# Patient Record
Sex: FEMALE | Race: WHITE | Employment: OTHER | URBAN - METROPOLITAN AREA
[De-identification: names, ages, dates, MRNs, and addresses within clinical notes are randomized per-mention and may not be internally consistent; named-entity substitution may affect disease eponyms.]

---

## 2017-01-09 ENCOUNTER — HOSPITAL ENCOUNTER (OUTPATIENT)
Dept: MAMMOGRAPHY | Age: 72
Discharge: HOME | End: 2017-01-09
Attending: FAMILY MEDICINE
Payer: MEDICARE

## 2017-01-09 ENCOUNTER — HOSPITAL ENCOUNTER (OUTPATIENT)
Dept: BONE DENSITY | Age: 72
Discharge: HOME | End: 2017-01-09
Attending: FAMILY MEDICINE
Payer: MEDICARE

## 2017-01-09 DIAGNOSIS — Z78.0 POSTMENOPAUSAL STATE: ICD-10-CM

## 2017-01-09 DIAGNOSIS — Z13.820 OSTEOPOROSIS SCREENING: ICD-10-CM

## 2017-01-09 DIAGNOSIS — Z12.31 VISIT FOR SCREENING MAMMOGRAM: ICD-10-CM

## 2017-01-09 PROCEDURE — 77067 SCR MAMMO BI INCL CAD: CPT

## 2017-01-09 PROCEDURE — 77080 DXA BONE DENSITY AXIAL: CPT

## 2017-01-14 NOTE — PROGRESS NOTES
This patient is normal using the World Health Organization criteria  As compared to the prior study, there has been a decrease in the bone mineral  density of the lumbar spine of 1.1% and no statistically significant change in  the bone mineral density of the left total hip. 10 year probability of major osteoporotic fracture:  6.7%  10 year probability of hip fracture:  0.4%  National Osteoporosis Foundation recommends beginning pharmacological therapy in  postmenopausal women and men over the age of 48 with a 8 year probability of a  hip fracture of >3% OR with the 10 year probability of a major osteoporotic  fracture of >20%. Cont her calcium and vit D regimen and her regular exercise. Can cont to monitor for now  Repeat 2 yrs.

## 2017-11-27 ENCOUNTER — HOSPITAL ENCOUNTER (OUTPATIENT)
Dept: LAB | Age: 72
Discharge: HOME | End: 2017-11-27
Payer: MEDICARE

## 2017-11-27 ENCOUNTER — OFFICE VISIT (OUTPATIENT)
Dept: FAMILY MEDICINE CLINIC | Age: 72
End: 2017-11-27

## 2017-11-27 VITALS
RESPIRATION RATE: 18 BRPM | OXYGEN SATURATION: 98 % | HEIGHT: 65 IN | SYSTOLIC BLOOD PRESSURE: 108 MMHG | TEMPERATURE: 97.9 F | WEIGHT: 120.2 LBS | HEART RATE: 64 BPM | DIASTOLIC BLOOD PRESSURE: 68 MMHG | BODY MASS INDEX: 20.03 KG/M2

## 2017-11-27 DIAGNOSIS — E78.00 HYPERCHOLESTEROLEMIA: ICD-10-CM

## 2017-11-27 DIAGNOSIS — Z86.2 HISTORY OF IRON DEFICIENCY ANEMIA: ICD-10-CM

## 2017-11-27 DIAGNOSIS — Z12.31 ENCOUNTER FOR SCREENING MAMMOGRAM FOR BREAST CANCER: ICD-10-CM

## 2017-11-27 DIAGNOSIS — Z71.89 ACP (ADVANCE CARE PLANNING): ICD-10-CM

## 2017-11-27 DIAGNOSIS — E03.9 HYPOTHYROIDISM, UNSPECIFIED TYPE: ICD-10-CM

## 2017-11-27 DIAGNOSIS — Z00.00 ROUTINE GENERAL MEDICAL EXAMINATION AT A HEALTH CARE FACILITY: Primary | ICD-10-CM

## 2017-11-27 PROCEDURE — 80061 LIPID PANEL: CPT

## 2017-11-27 PROCEDURE — 82306 VITAMIN D 25 HYDROXY: CPT

## 2017-11-27 PROCEDURE — 80053 COMPREHEN METABOLIC PANEL: CPT

## 2017-11-27 PROCEDURE — 85027 COMPLETE CBC AUTOMATED: CPT

## 2017-11-27 PROCEDURE — 84443 ASSAY THYROID STIM HORMONE: CPT

## 2017-11-27 PROCEDURE — 81001 URINALYSIS AUTO W/SCOPE: CPT

## 2017-11-27 PROCEDURE — 36415 COLL VENOUS BLD VENIPUNCTURE: CPT

## 2017-11-27 NOTE — PROGRESS NOTES
Chief Complaint   [facility]   [facility]     -[id number]      1. Have you been to the ER, urgent care clinic since your last visit? Hospitalized since your last visit? No    2. Have you seen or consulted any other health care providers outside of the [address] [place] [address] since your last visit? Include any pap smears or colon screening.    Yes Dr Ashish Avila - Dermatologist

## 2017-11-27 NOTE — MR AVS SNAPSHOT
Visit Information Date & Time Provider Department Dept. Phone Encounter #  
 11/27/2017  9:00 AM Perico Larson, [address] [facility] [phone] [id number] Upcoming Health Maintenance Date Due COLONOSCOPY 1/17/2018 Hepatitis C Screening 1/3/2028* MEDICARE YEARLY EXAM 11/28/2018 BREAST CANCER SCRN MAMMOGRAM 1/9/2019 GLAUCOMA SCREENING Q2Y 11/27/2019 DTaP/Tdap/Td series (2 - Td) 11/21/2026 *Topic was postponed. The date shown is not the original due date. Allergies as of 11/27/2017  Review Complete On: 11/27/2017 By: Perico Larson MD  
  
 Severity Noted Reaction Type Reactions Pcn [Penicillins]  04/22/2010    Other (comments) As child-can't remember Current Immunizations  Reviewed on 11/27/2017 Name Date Hep A Vaccine 5/20/2013 Hep A Vaccine (Adult) 2/12/2014 Influenza High Dose Vaccine PF 11/10/2017, 11/7/2016 Influenza Vaccine 10/31/2015, 10/14/2014, 10/15/2013 Influenza Vaccine Split 10/10/2012 Influenza Vaccine Whole 10/4/2011 Pneumococcal Conjugate (PCV-13) 12/1/2016 Pneumococcal Polysaccharide (PPSV-23) 11/20/2015, 10/1/2010 TD Vaccine 9/26/2008 Reviewed by Perico Larson MD on 11/27/2017 at  9:39 AM  
You Were Diagnosed With   
  
 Codes Comments Routine general medical examination at a health care facility    -  Primary ICD-10-CM: Z00.00 ICD-9-CM: V70.0 ACP (advance care planning)     ICD-10-CM: Z71.89 ICD-9-CM: V65.49 Hypercholesterolemia     ICD-10-CM: E78.00 ICD-9-CM: 272.0 Hypothyroidism, unspecified type     ICD-10-CM: E03.9 ICD-9-CM: 743. 9 Vitals BP Pulse Temp Resp Height(growth percentile) Weight(growth percentile) 108/68 (BP 1 Location: Left arm, BP Patient Position: Sitting) 64 97.9 °F (36.6 °C) (Oral) 18 5' 4.5\" (1.638 m) 120 lb 3.2 oz (54.5 kg) SpO2 BMI OB Status Smoking Status 98% 20.31 kg/m2 Postmenopausal Never Smoker BMI and BSA Data Body Mass Index Body Surface Area  
 20.31 kg/m 2 1.57 m 2 Preferred Pharmacy Pharmacy Name Phone Samantha Miranda [address] [place], [place] [address] [facility] [address] [phone] Your Updated Medication List  
  
   
This list is accurate as of: 11/27/17  9:46 AM.  Always use your most recent med list.  
  
  
  
  
 CALCIUM 500+D 500 mg(1,250mg) -200 unit per tablet Generic drug:  calcium-vitamin D Take 1 Tab by mouth two (2) times daily (with meals). levothyroxine 25 mcg tablet Commonly known as:  SYNTHROID  
TAKE 1 TABLET EVERY DAY  BEFORE  BREAKFAST  
  
 VITAMIN D3 PO Take 2,000 Units by mouth Three (3) times a week. Patient Instructions Well Visit, Over 72: Care Instructions Your Care Instructions Physical exams can help you stay healthy. Your doctor has checked your overall health and may have suggested ways to take good care of yourself. He or she also may have recommended tests. At home, you can help prevent illness with healthy eating, regular exercise, and other steps. Follow-up care is a key part of your treatment and safety. Be sure to make and go to all appointments, and call your doctor if you are having problems. It's also a good idea to know your test results and keep a list of the medicines you take. How can you care for yourself at home? · Reach and stay at a healthy weight. This will lower your risk for many problems, such as obesity, diabetes, heart disease, and high blood pressure. · Get at least 30 minutes of exercise on most days of the week. Walking is a good choice. You also may want to do other activities, such as running, swimming, cycling, or playing tennis or team sports. · Do not smoke. Smoking can make health problems worse. If you need help quitting, talk to your doctor about stop-smoking programs and medicines. These can increase your chances of quitting for good. · Protect your skin from too much sun. When you're outdoors from 10 a.m. to 4 p.m., stay in the shade or cover up with clothing and a hat with a wide brim. Wear sunglasses that block UV rays. Even when it's cloudy, put broad-spectrum sunscreen (SPF 30 or higher) on any exposed skin. · See a dentist one or two times a year for checkups and to have your teeth cleaned. · Wear a seat belt in the car. · Limit alcohol to 2 drinks a day for men and 1 drink a day for women. Too much alcohol can cause health problems. Follow your doctor's advice about when to have certain tests. These tests can spot problems early. For men and women · Cholesterol. Your doctor will tell you how often to have this done based on your overall health and other things that can increase your risk for heart attack and stroke. · Blood pressure. Have your blood pressure checked during a routine doctor visit. Your doctor will tell you how often to check your blood pressure based on your age, your blood pressure results, and other factors. · Diabetes. Ask your doctor whether you should have tests for diabetes. · Vision. Experts recommend that you have yearly exams for glaucoma and other age-related eye problems. · Hearing. Tell your doctor if you notice any change in your hearing. You can have tests to find out how well you hear. · Colon cancer tests. Keep having colon cancer tests as your doctor recommends. You can have one of several types of tests. · Heart attack and stroke risk. At least every 4 to 6 years, you should have your risk for heart attack and stroke assessed. Your doctor uses factors such as your age, blood pressure, cholesterol, and whether you smoke or have diabetes to show what your risk for a heart attack or stroke is over the next 10 years. · Osteoporosis.  Talk to your doctor about whether you should have a bone density test to find out whether you have thinning bones. Also ask your doctor about whether you should take calcium and vitamin D supplements. For women · Pap test and pelvic exam. You may no longer need a Pap test. Talk with your doctor about whether to stop or continue to have Pap tests. · Breast exam and mammogram. Ask how often you should have a mammogram, which is an X-ray of your breasts. A mammogram can spot breast cancer before it can be felt and when it is easiest to treat. · Thyroid disease. Talk to your doctor about whether to have your thyroid checked as part of a regular physical exam. Women have an increased chance of a thyroid problem. For men · Prostate exam. Talk to your doctor about whether you should have a blood test (called a PSA test) for prostate cancer. Experts disagree on whether men should have this test. Some experts recommend that you discuss the benefits and risks of the test with your doctor. · Abdominal aortic aneurysm. Ask your doctor whether you should have a test to check for an aneurysm. You may need a test if you ever smoked or if your parent, brother, sister, or child has had an aneurysm. When should you call for help? Watch closely for changes in your health, and be sure to contact your doctor if you have any problems or symptoms that concern you. Where can you learn more? Go to http://jami-elisa.info/. Enter B846 in the search box to learn more about \"Well Visit, Over 65: Care Instructions. \" Current as of: May 12, 2017 Content Version: 11.4 © 6127-9079 Healthwise, Incorporated. Care instructions adapted under license by [employer] (which disclaims liability or warranty for this information). If you have questions about a medical condition or this instruction, always ask your healthcare professional. [place] [address] any warranty or liability for your use of this information. Introducing [facility]! Dear Gilma Corey: Thank you for requesting a [employer] account. Our records indicate that you already have an active [employer] account. You can access your account anytime at [email or web address] Did you know that you can access your hospital and ER discharge instructions at any time in [employer]? You can also review all of your test results from your hospital stay or ER visit. Additional Information If you have questions, please visit the Frequently Asked Questions section of the [employer] website at [email or web address] Remember, [employer] is NOT to be used for urgent needs. For medical emergencies, dial 911. Now available from your iPhone and Android! Please provide this summary of care documentation to your next provider. Your primary care clinician is listed as TARA MOON. If you have any questions after today's visit, please call [phone].

## 2017-11-27 NOTE — PROGRESS NOTES
Day [place] is a 81 y.o. female and presents for annual Medicare Wellness Visit. Problem List: Reviewed with patient and discussed risk factors.     Patient Active Problem List   Diagnosis Code    Family history of colon cancer Z80.0    Family history of breast cancer Z80.3    History of mild iron deficiency anemia Z86.2    Hypothyroidism E03.9    Postmenopausal, LMP 2006, No HRT Z78.0    Benign SQ Mass on right back R22.2    Right Inguinal hernia, mild K40.90    S/P colonoscopy Z98.890    Mild Hypercholesterolemia with Excellent HDL E78.00    Screening exams for skin cancer Z12.83    S/P Fever of unknown origin (FUO), Suspected Viral Illness R50.9    Hypothyroidism, adult E03.9    ACP (advance care planning) Z71.89       Current medical providers:  Patient Care Team:  Suellen Manuel MD as PCP - General    PSH: Reviewed with patient  Past Surgical History:   Procedure Laterality Date    BIOPSY BREAST  1981    negative, left breast; Dr Primus Cogan  2002    benign polyp; Dr Fermín Harper COLONOSCOPY  11/2007    normal; ok x 5yrs; Dr Clem Jerome; f/u 5 yrs    DEXA BONE DENSITY STUDY AXIAL  10/2001    normal    DEXA BONE DENSITY STUDY AXIAL  10/2007    normal    EKG ORDERABLE  9/2008    Sinus bradycardia    EKG ORDERABLE  9/2009    Sinus bradycardia    HM DEXA SCAN  10/2009, 10/2011    VPI (scanned reports)    HX BREAST BIOPSY Left     years ago; neg    HX COLONOSCOPY  1/17/13, Dr Eve Jang    Internal Hemorrhoids; repeat 5 yrs    HX DILATION AND CURETTAGE  1976    SAB    HX VEIN [address]    varicose veins, Dr Field Sessions Left 06/2016    for varicose veins    XR HIP RT W OR WO PELV 2-3 VWS  9/2008    normal        SH: Reviewed with patient  Social History   Substance Use Topics    Smoking status: Never Smoker    Smokeless tobacco: Never Used    Alcohol use Yes      Comment: 1 glass wine 2-3 times/week       FH: Reviewed with patient  Family History Problem Relation Age of Onset    Cancer Mother      lung cancer    Hypertension Mother    [facility] Arthritis-osteo Mother      obese    Diabetes Father     Cancer Father      bladder cancer and BCC skin cancer    Colon Cancer Sister       age 54    Arthritis-osteo Sister    [facility] Breast Cancer Sister      diagnosed age ~ 62, survivor    Depression Son     Anxiety Son     Breast Cancer Other      great aunt       Medications/Allergies: Reviewed with patient  Current Outpatient Prescriptions on File Prior to Visit   Medication Sig Dispense Refill    levothyroxine (SYNTHROID) 25 mcg tablet TAKE 1 TABLET EVERY DAY  BEFORE  BREAKFAST 90 Tab 3    CHOLECALCIFEROL, VITAMIN D3, (VITAMIN D3 PO) Take 2,000 Units by mouth Three (3) times a week.  calcium-vitamin D (CALCIUM 500+D) 500 mg(1,250mg) -200 unit per tablet Take 1 Tab by mouth two (2) times daily (with meals). No current facility-administered medications on file prior to visit. Allergies   Allergen Reactions    Pcn [Penicillins] Other (comments)     As child-can't remember       Objective:  Visit Vitals    /68 (BP 1 Location: Left arm, BP Patient Position: Sitting)    Pulse 64    Temp 97.9 °F (36.6 °C) (Oral)    Resp 18    Ht 5' 4.5\" (1.638 m)    Wt 120 lb 3.2 oz (54.5 kg)    SpO2 98%    BMI 20.31 kg/m2    Body mass index is 20.31 kg/(m^2). Assessment of cognitive impairment: Alert and oriented x 3    Depression Screen:   PHQ over the last two weeks 2017   Little interest or pleasure in doing things Not at all   Feeling down, depressed or hopeless Not at all   Total Score PHQ 2 0       Fall Risk Assessment:    Fall Risk Assessment, last 12 mths 2017   Able to walk? Yes   Fall in past 12 months? No       Functional Ability:   Does the patient exhibit a steady gait? yes   How long did it take the patient to get up and walk from a sitting position?  1 sec   Is the patient self reliant?  (ie can do own laundry, meals, household chores)  yes     Does the patient handle his/her own medications? yes     Does the patient handle his/her own money? yes     Is the patients home safe (ie good lighting, handrails on stairs and bath, etc.)? yes     Did you notice or did patient express any hearing difficulties? no     Did you notice or did patient express any vision difficulties?   no     Were distance and reading eye charts used? no       Advance Care Planning:   Patient was offered the opportunity to discuss advance care planning:  yes     Does patient have an Advance Directive:  yes   If no, did you provide information on Caring Connections? Plan:      Orders Placed This Encounter    JUJU MAMMO BI SCREENING INCL CAD    CBC W/O DIFF    LIPID PANEL    METABOLIC PANEL, COMPREHENSIVE    TSH 3RD GENERATION    URINALYSIS W/ RFLX MICROSCOPIC    VITAMIN D, 25 HYDROXY       Health Maintenance   Topic Date Due    COLONOSCOPY  01/17/2018    Hepatitis C Screening  01/03/[birth year]28 (Originally 1945)    MEDICARE YEARLY EXAM  11/28/2018    BREAST CANCER SCRN MAMMOGRAM  01/09/2019    GLAUCOMA SCREENING Q2Y  11/27/2019    DTaP/Tdap/Td series (2 - Td) 11/21/2026    OSTEOPOROSIS SCREENING (DEXA)  Completed    ZOSTER VACCINE AGE 60>  Completed    Pneumococcal 65+ Low/Medium Risk  Completed    Influenza Age 5 to Adult  Completed       *Patient verbalized understanding and agreement with the plan. A copy of the After Visit Summary with personalized health plan was given to the patient today.

## 2017-11-27 NOTE — ACP (ADVANCE CARE PLANNING)
ACP discussion w/ patient again today. Patient and  have an AMD. No additional questions, concerns, amendments at this time.

## 2017-11-27 NOTE — PROGRESS NOTES
HISTORY OF PRESENT ILLNESS   HPI  Fasting follow up mild hypercholesterolemia, hypothyroidism, labs and medication check. Overall has been getting along well and feeling well in general.  No complaints or concerns at this time. Still travels a lot w/ , they do a lot of hiking and international travel. Most recently [facility] a few weeks ago. Will be in [place] for the holidays next month. Good energy. No exertional sx. REVIEW OF SYMPTOMS     Review of Systems   Constitutional: Negative. Eyes:        Eye doctor visit today   Respiratory: Negative. Negative for shortness of breath. Cardiovascular: Negative. Negative for chest pain, palpitations, claudication and leg swelling. Gastrointestinal: Negative. Genitourinary: Negative. Musculoskeletal: Negative. Skin:        Sees Dermatologist routinely, had appt there recently.    Neurological: Negative.            PROBLEM LIST/MEDICAL HISTORY      Problem List  Date Reviewed: 11/27/2017          Codes Class Noted    ACP (advance care planning) ICD-10-CM: Z71.89  ICD-9-CM: V65.49  11/27/2017    Overview Signed 11/27/2017  9:51 AM by Charla Adams MD     Patient has an AMD. ACP note              Hypothyroidism, adult ICD-10-CM: E03.9  ICD-9-CM: 244.9  11/20/2015    Overview Signed 11/20/2015 10:01 AM by Charla Adams MD     Dx 2008; borderline tests 2003             S/P Fever of unknown origin (FUO), Suspected Viral Illness ICD-10-CM: R50.9  ICD-9-CM: 780.60  9/29/2014    Overview Signed 9/29/2014 12:51 PM by Charla Adams MD     7/2014-8/2014: ID Dr Jacobo Thacker             Mild Hypercholesterolemia with Excellent HDL ICD-10-CM: E78.00  ICD-9-CM: 272.0  11/14/2013        Screening exams for skin cancer ICD-10-CM: Z12.83  ICD-9-CM: V76.43  11/14/2013    Overview Signed 11/14/2013  3:11 PM by Charla Adams MD     Derm: Previously Dr Mounika Greene, then Dr Wan Salgado             Right Inguinal hernia, mild ICD-10-CM: K40.90  ICD-9-CM: 550.90  9/29/2010    Overview Signed 9/29/2010 10:00 AM by Ambrocio Leary MD     [id number]             S/P colonoscopy ICD-10-CM: T35.242  ICD-9-CM: V45.89  9/29/2010    Overview Signed 9/29/2010 10:01 AM by Ambrocio Leary MD     2002 benign polyp; 2007, normal; f/u 5 yrs; Dr Nickie Leary             Family history of colon cancer ICD-10-CM: Z80.0  ICD-9-CM: V16.0  4/22/2010    Overview Signed 4/22/2010  9:50 AM by Stan Rogers     sister             Family history of breast cancer ICD-10-CM: Z80.3  ICD-9-CM: V16.3  4/22/2010    Overview Signed 4/22/2010  9:50 AM by Stan Rogers     sister             History of mild iron deficiency anemia ICD-10-CM: Z86.2  ICD-9-CM: V12.3  4/22/2010        Hypothyroidism ICD-10-CM: E03.9  ICD-9-CM: 244.9  4/22/2010    Overview Addendum 9/29/2010  9:59 AM by Ambrocio Leary MD     Dx 2008; borderline tests 2003             Postmenopausal, LMP 2006, No HRT ICD-10-CM: Z78.0  ICD-9-CM: V49.81  4/22/2010    Overview Signed 4/22/2010  9:52 AM by Stan Rogers     LMP 10/2006 no HRT             Benign SQ Mass on right back ICD-10-CM: R22.2  ICD-9-CM: 782.2  4/22/2010    Overview Signed 4/22/2010  9:53 AM by Genny Sharma     1980's R low back mass Lipoma vs Cyst                       PAST SURGICAL HISTORY       Past Surgical History:   Procedure Laterality Date    BIOPSY BREAST  1981    negative, left breast; Dr Chris Hughes  2002    benign polyp; Dr Ernesto Sanchez  11/2007    normal; ok x 5yrs; Dr Wilburn Rm; f/u 5 yrs    DEXA BONE DENSITY STUDY AXIAL  10/2001    normal    DEXA BONE DENSITY STUDY AXIAL  10/2007    normal    EKG ORDERABLE  9/2008    Sinus bradycardia    EKG ORDERABLE  9/2009    Sinus bradycardia    HM DEXA SCAN  10/2009, 10/2011    VPI (scanned reports)    HX BREAST BIOPSY Left     years ago; neg    HX COLONOSCOPY  1/17/13, Dr Gavi Toscano    Internal Hemorrhoids; repeat 5 yrs    HX DILATION AND CURETTAGE 1976    SAB    HX VEIN STRIPPING  1982    varicose veins, Dr Pierre Cameron Left 06/2016    for varicose veins    XR HIP RT W OR WO PELV 2-3 VWS  9/2008    normal         MEDICATIONS      Current Outpatient Prescriptions   Medication Sig    levothyroxine (SYNTHROID) 25 mcg tablet TAKE 1 TABLET EVERY DAY  BEFORE  BREAKFAST    CHOLECALCIFEROL, VITAMIN D3, (VITAMIN D3 PO) Take 2,000 Units by mouth Three (3) times a week.  calcium-vitamin D (CALCIUM 500+D) 500 mg(1,250mg) -200 unit per tablet Take 1 Tab by mouth two (2) times daily (with meals). No current facility-administered medications for this visit. ALLERGIES     Allergies   Allergen Reactions    Pcn [Penicillins] Other (comments)     As child-can't remember          SOCIAL HISTORY       Social History     Social History    Marital status:      Spouse name: N/A    Number of children: 3    Years of education: N/A     Occupational History    Homemaker      Social History Main Topics    Smoking status: Never Smoker    Smokeless tobacco: Never Used    Alcohol use Yes      Comment: 1 glass wine 2-3 times/week    Drug use: No    Sexual activity: Yes     Partners: Male     Other Topics Concern     Service No    Blood Transfusions No    Caffeine Concern No     none, except occ chocolate    Occupational Exposure No    Hobby Hazards No    Sleep Concern No    Stress Concern No    Weight Concern No     pretty stable, usually runs ~ 120 range    Special Diet No     sensible diet, well balanced    Back Care No    Exercise Yes     hikes alot w/ , walks regularly ~ 45minutes to an hour almost every day; tennis seasonally ~ 2x a week; occ bikes    Bike Helmet Yes    Seat Belt Yes    Self-Exams Yes     Social History Narrative    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        IMMUNIZATIONS  Immunization History Administered Date(s) Administered    Hep A Vaccine 2013    Hep A Vaccine (Adult) 2014    Influenza High Dose Vaccine PF 2016, 11/10/2017    Influenza Vaccine 10/15/2013, 10/14/2014, 10/31/2015    Influenza Vaccine Split 10/10/2012    Influenza Vaccine Whole 10/04/2011    Pneumococcal Conjugate (PCV-13) 2016    Pneumococcal Polysaccharide (PPSV-23) 10/01/2010, 2015    TD Vaccine 2008         FAMILY HISTORY     Family History   Problem Relation Age of Onset    Cancer Mother      lung cancer    Hypertension Mother    [facility] Arthritis-osteo Mother      obese    Diabetes Father     Cancer Father      bladder cancer and BCC skin cancer    Colon Cancer Sister       age 52    [de-identified] Sister    [facility] Breast Cancer Sister      diagnosed age ~ 62, survivor    Depression Son     Anxiety Son     Breast Cancer Other      great aunt         VITALS     Visit Vitals    /68 (BP 1 Location: Left arm, BP Patient Position: Sitting)    Pulse 64    Temp 97.9 °F (36.6 °C) (Oral)    Resp 18    Ht 5' 4.5\" (1.638 m)    Wt 120 lb 3.2 oz (54.5 kg)    SpO2 98%    BMI 20.31 kg/m2          PHYSICAL EXAMINATION     Physical Exam   Constitutional: She is well-developed, well-nourished, and in no distress. HENT:   Right Ear: Tympanic membrane normal.   Left Ear: Tympanic membrane normal.   Nose: Nose normal.   Mouth/Throat: Oropharynx is clear and moist. No oropharyngeal exudate. Neck: Neck supple. Carotid bruit is not present. No thyromegaly present. Cardiovascular: Normal rate, regular rhythm and normal heart sounds. No murmur heard. Pulmonary/Chest: Effort normal and breath sounds normal.   Abdominal: Soft. Bowel sounds are normal. She exhibits no distension and no mass. There is no tenderness. Musculoskeletal: She exhibits no edema or tenderness. Lymphadenopathy:     She has no cervical adenopathy.    Neurological: Gait normal.   Vitals reviewed.        ASSESSMENT & PLAN       ICD-10-CM ICD-9-CM    1. Routine general medical examination at a health care facility Z00.00 V70.0 Medicare AWV,    2. ACP (advance care planning) Z71.89 V65.49 Patient has an AMD   3. Mild Hypercholesterolemia with Excellent HDL E78.00 272.0 LIPID PANEL      METABOLIC PANEL, COMPREHENSIVE      URINALYSIS W/ RFLX MICROSCOPIC   4. Hypothyroidism, unspecified type E22.4 479.5 METABOLIC PANEL, COMPREHENSIVE      TSH 3RD GENERATION      VITAMIN D, 25 HYDROXY   5. History of mild iron deficiency anemia Z86.2 V12.3 CBC W/O DIFF   6. Encounter for screening mammogram for breast cancer Z12.31 V76.12 JUJU MAMMO BI SCREENING INCL CAD     Fasting labs today  Reviewed diet, nutrition, exercise and cardiovascular risk and specific lipid/LDL goals reviewed  Health maintenance, wellness, prevention counseling, screening recommendations  Further follow up & other recommendations pending review of labs. If all remains good and stable, RTC  1yr for annual checkup and wellness visit. Follow up sooner prn  Mammogram ordered, due 1-2018.   She is already scheduled for her 5 yr colonoscopy follow up 1-18-18

## 2017-11-27 NOTE — PATIENT INSTRUCTIONS
Well Visit, Over 72: Care Instructions  Your Care Instructions    Physical exams can help you stay healthy. Your doctor has checked your overall health and may have suggested ways to take good care of yourself. He or she also may have recommended tests. At home, you can help prevent illness with healthy eating, regular exercise, and other steps. Follow-up care is a key part of your treatment and safety. Be sure to make and go to all appointments, and call your doctor if you are having problems. It's also a good idea to know your test results and keep a list of the medicines you take. How can you care for yourself at home? · Reach and stay at a healthy weight. This will lower your risk for many problems, such as obesity, diabetes, heart disease, and high blood pressure. · Get at least 30 minutes of exercise on most days of the week. Walking is a good choice. You also may want to do other activities, such as running, swimming, cycling, or playing tennis or team sports. · Do not smoke. Smoking can make health problems worse. If you need help quitting, talk to your doctor about stop-smoking programs and medicines. These can increase your chances of quitting for good. · Protect your skin from too much sun. When you're outdoors from 10 a.m. to 4 p.m., stay in the shade or cover up with clothing and a hat with a wide brim. Wear sunglasses that block UV rays. Even when it's cloudy, put broad-spectrum sunscreen (SPF 30 or higher) on any exposed skin. · See a dentist one or two times a year for checkups and to have your teeth cleaned. · Wear a seat belt in the car. · Limit alcohol to 2 drinks a day for men and 1 drink a day for women. Too much alcohol can cause health problems. Follow your doctor's advice about when to have certain tests. These tests can spot problems early. For men and women  · Cholesterol.  Your doctor will tell you how often to have this done based on your overall health and other things that can increase your risk for heart attack and stroke. · Blood pressure. Have your blood pressure checked during a routine doctor visit. Your doctor will tell you how often to check your blood pressure based on your age, your blood pressure results, and other factors. · Diabetes. Ask your doctor whether you should have tests for diabetes. · Vision. Experts recommend that you have yearly exams for glaucoma and other age-related eye problems. · Hearing. Tell your doctor if you notice any change in your hearing. You can have tests to find out how well you hear. · Colon cancer tests. Keep having colon cancer tests as your doctor recommends. You can have one of several types of tests. · Heart attack and stroke risk. At least every 4 to 6 years, you should have your risk for heart attack and stroke assessed. Your doctor uses factors such as your age, blood pressure, cholesterol, and whether you smoke or have diabetes to show what your risk for a heart attack or stroke is over the next 10 years. · Osteoporosis. Talk to your doctor about whether you should have a bone density test to find out whether you have thinning bones. Also ask your doctor about whether you should take calcium and vitamin D supplements. For women  · Pap test and pelvic exam. You may no longer need a Pap test. Talk with your doctor about whether to stop or continue to have Pap tests. · Breast exam and mammogram. Ask how often you should have a mammogram, which is an X-ray of your breasts. A mammogram can spot breast cancer before it can be felt and when it is easiest to treat. · Thyroid disease. Talk to your doctor about whether to have your thyroid checked as part of a regular physical exam. Women have an increased chance of a thyroid problem. For men  · Prostate exam. Talk to your doctor about whether you should have a blood test (called a PSA test) for prostate cancer.  Experts disagree on whether men should have this test. Some experts recommend that you discuss the benefits and risks of the test with your doctor. · Abdominal aortic aneurysm. Ask your doctor whether you should have a test to check for an aneurysm. You may need a test if you ever smoked or if your parent, brother, sister, or child has had an aneurysm. When should you call for help? Watch closely for changes in your health, and be sure to contact your doctor if you have any problems or symptoms that concern you. Where can you learn more? Go to http://jami-elisa.info/. Enter N498 in the search box to learn more about \"Well Visit, Over 65: Care Instructions. \"  Current as of: May 12, 2017  Content Version: 11.4  © 8496-8017 [employer]. Care instructions adapted under license by [employer] (which disclaims liability or warranty for this information). If you have questions about a medical condition or this instruction, always ask your healthcare professional. [place] [address] any warranty or liability for your use of this information. Schedule of Personalized Health Plan  (Provide Copy to Patient)  The best way to stay healthy is to live a healthy lifestyle. A healthy lifestyle includes regular exercise, eating a well-balanced diet, keeping a healthy weight and not smoking. Regular physical exams and screening tests are another important way to take care of yourself. Preventive exams provided by health care providers can find health problems early when treatment works best and can keep you from getting certain diseases or illnesses. Preventive services include exams, lab tests, screenings, shots, monitoring and information to help you take care of your own health. All people over 65 should have a pneumonia shot. Pneumonia shots are usually only needed once in a lifetime unless your doctor decides differently. All people over 65 should have a yearly flu shot.     People over 65 are at medium to high risk for Hepatitis B. Three shots are needed for complete protection. In addition to your physical exam, some screening tests are recommended:    Bone mass measurement (dexa scan) is recommended every two years  Diabetes Mellitus screening is recommended every year. Glaucoma is an eye disease caused by high pressure in the eye. An eye exam is recommended every year. Cardiovascular screening tests that check your cholesterol and other blood fat (lipid) levels are recommended every five years. Colorectal Cancer screening tests help to find pre-cancerous polyps (growths in the colon) so they can be removed before they turn into cancer. Tests ordered for screening depend on your personal and family history risk factors.     Screening for Breast Cancer is recommended yearly with a mammogram.    Screening for Cervical Cancer is recommended every two years (annually for certain risk factors, such as previous history of STD or abnormal PAP in past 7 years), with a Pelvic Exam with PAP    Here is a list of your current Health Maintenance items with a due date:  Health Maintenance   Topic Date Due    COLONOSCOPY  01/17/2018    Hepatitis C Screening  01/03/2028 (Originally 1945)   [id number] [facility]  11/28/2018    BREAST CANCER SCRN MAMMOGRAM  01/09/2019    GLAUCOMA SCREENING Q2Y  11/27/2019    DTaP/Tdap/Td series (2 - Td) 11/21/2026    OSTEOPOROSIS SCREENING (DEXA)  Completed    ZOSTER VACCINE AGE 60>  Completed    Pneumococcal 65+ Low/Medium Risk  Completed    Influenza Age 5 to Adult  Completed

## 2017-11-28 LAB
25(OH)D3+25(OH)D2 SERPL-MCNC: 40.4 NG/ML (ref 30–100)
ALBUMIN SERPL-MCNC: 4.3 G/DL (ref 3.5–4.8)
ALBUMIN/GLOB SERPL: 1.6 {RATIO} (ref 1.2–2.2)
ALP SERPL-CCNC: 53 IU/L (ref 39–117)
ALT SERPL-CCNC: 16 IU/L (ref 0–32)
APPEARANCE UR: CLEAR
AST SERPL-CCNC: 26 IU/L (ref 0–40)
BILIRUB SERPL-MCNC: 0.7 MG/DL (ref 0–1.2)
BILIRUB UR QL STRIP: NEGATIVE
BUN SERPL-MCNC: 17 MG/DL (ref 8–27)
BUN/CREAT SERPL: 22 (ref 12–28)
CALCIUM SERPL-MCNC: 9.7 MG/DL (ref 8.7–10.3)
CHLORIDE SERPL-SCNC: 99 MMOL/L (ref 96–106)
CHOLEST SERPL-MCNC: 243 MG/DL (ref 100–199)
CO2 SERPL-SCNC: 25 MMOL/L (ref 18–29)
COLOR UR: YELLOW
CREAT SERPL-MCNC: 0.77 MG/DL (ref 0.57–1)
ERYTHROCYTE [DISTWIDTH] IN BLOOD BY AUTOMATED COUNT: 13 % (ref 12.3–15.4)
GFR SERPLBLD CREATININE-BSD FMLA CKD-EPI: 78 ML/MIN/1.73
GFR SERPLBLD CREATININE-BSD FMLA CKD-EPI: 90 ML/MIN/1.73
GLOBULIN SER CALC-MCNC: 2.7 G/DL (ref 1.5–4.5)
GLUCOSE SERPL-MCNC: 82 MG/DL (ref 65–99)
GLUCOSE UR QL: NEGATIVE
HCT VFR BLD AUTO: 40.1 % (ref 34–46.6)
HDLC SERPL-MCNC: 91 MG/DL
HGB BLD-MCNC: 13.9 G/DL (ref 11.1–15.9)
HGB UR QL STRIP: NEGATIVE
INTERPRETATION, 910389: NORMAL
KETONES UR QL STRIP: NEGATIVE
LDLC SERPL CALC-MCNC: 140 MG/DL (ref 0–99)
LEUKOCYTE ESTERASE UR QL STRIP: NEGATIVE
MCH RBC QN AUTO: 31.3 PG (ref 26.6–33)
MCHC RBC AUTO-ENTMCNC: 34.7 G/DL (ref 31.5–35.7)
MCV RBC AUTO: 90 FL (ref 79–97)
MICRO URNS: NORMAL
NITRITE UR QL STRIP: NEGATIVE
PH UR STRIP: 6 [PH] (ref 5–7.5)
PLATELET # BLD AUTO: 226 X10E3/UL (ref 150–379)
POTASSIUM SERPL-SCNC: 4.2 MMOL/L (ref 3.5–5.2)
PROT SERPL-MCNC: 7 G/DL (ref 6–8.5)
PROT UR QL STRIP: NEGATIVE
RBC # BLD AUTO: 4.44 X10E6/UL (ref 3.77–5.28)
SODIUM SERPL-SCNC: 143 MMOL/L (ref 134–144)
SP GR UR: 1.01 (ref 1–1.03)
TRIGL SERPL-MCNC: 62 MG/DL (ref 0–149)
TSH SERPL DL<=0.005 MIU/L-ACNC: 2.55 UIU/ML (ref 0.45–4.5)
UROBILINOGEN UR STRIP-MCNC: 0.2 MG/DL (ref 0.2–1)
VLDLC SERPL CALC-MCNC: 12 MG/DL (ref 5–40)
WBC # BLD AUTO: 4.5 X10E3/UL (ref 3.4–10.8)

## 2017-12-31 RX ORDER — LEVOTHYROXINE SODIUM 25 UG/1
TABLET ORAL
Qty: 90 TAB | Refills: 3 | Status: SHIPPED | OUTPATIENT
Start: 2017-12-31 | End: 2019-01-22 | Stop reason: SDUPTHER

## 2017-12-31 NOTE — TELEPHONE ENCOUNTER
Future Appointments:  1/17/2018  9:30 AM    [facility]                  [address] [facility] [address]  11/28/2018 9:00 AM    [address] [facility] [place]  [facility]

## 2018-01-31 ENCOUNTER — HOSPITAL ENCOUNTER (OUTPATIENT)
Dept: MAMMOGRAPHY | Age: 73
Discharge: HOME | End: 2018-01-31
Attending: FAMILY MEDICINE
Payer: MEDICARE

## 2018-01-31 DIAGNOSIS — Z12.31 ENCOUNTER FOR SCREENING MAMMOGRAM FOR BREAST CANCER: ICD-10-CM

## 2018-01-31 PROCEDURE — 77067 SCR MAMMO BI INCL CAD: CPT

## 2018-04-13 ENCOUNTER — TELEPHONE (OUTPATIENT)
Dept: FAMILY MEDICINE CLINIC | Age: 73
End: 2018-04-13

## 2018-04-13 NOTE — TELEPHONE ENCOUNTER
----- Message -----         From: Sundeep Squires        Sent: 4/13/2018   9:31 AM          To: [facility]  Pool     Subject: Appointment Request ()                               Appointment Request From: Sundeep Squires          With Provider: Belinda Agarwal MD [-Primary Care Physician-]          Preferred Date Range: Any          Preferred Times: Any          Reason: To address the following health maintenance concerns.     Colonoscopy          Comments:     I had a colonoscopy on February 1, 2018 with Dr. Jacqueline Jeffers.  You should have received results from him; please update My chart.

## 2018-11-28 ENCOUNTER — OFFICE VISIT (OUTPATIENT)
Dept: FAMILY MEDICINE CLINIC | Age: 73
End: 2018-11-28

## 2018-11-28 VITALS
RESPIRATION RATE: 18 BRPM | OXYGEN SATURATION: 97 % | DIASTOLIC BLOOD PRESSURE: 60 MMHG | BODY MASS INDEX: 19.66 KG/M2 | SYSTOLIC BLOOD PRESSURE: 116 MMHG | HEIGHT: 65 IN | HEART RATE: 60 BPM | WEIGHT: 118 LBS | TEMPERATURE: 97.6 F

## 2018-11-28 DIAGNOSIS — E03.9 HYPOTHYROIDISM, ADULT: ICD-10-CM

## 2018-11-28 DIAGNOSIS — Z00.00 MEDICARE ANNUAL WELLNESS VISIT, SUBSEQUENT: Primary | ICD-10-CM

## 2018-11-28 DIAGNOSIS — E78.00 HYPERCHOLESTEROLEMIA: ICD-10-CM

## 2018-11-28 NOTE — PROGRESS NOTES
Chief Complaint Patient presents with [place] Annual Wellness Visit -SMAVRWA 1. Have you been to the ER, urgent care clinic since your last visit? Hospitalized since your last visit? No 
 
2. Have you seen or consulted any other health care providers outside of the [facility] since your last visit? Include any pap smears or colon screening.  No

## 2018-11-28 NOTE — PATIENT INSTRUCTIONS
Medicare Wellness Visit, Female The best way to live healthy is to have a lifestyle where you eat a well-balanced diet, exercise regularly, limit alcohol use, and quit all forms of tobacco/nicotine, if applicable. Regular preventive services are another way to keep healthy. Preventive services (vaccines, screening tests, monitoring & exams) can help personalize your care plan, which helps you manage your own care. Screening tests can find health problems at the earliest stages, when they are easiest to treat. [facility] follows the current, evidence-based guidelines published by the [facility] ([facility]) when recommending preventive services for our patients. Because we follow these guidelines, sometimes recommendations change over time as research supports it. (For example, mammograms used to be recommended annually. Even though Medicare will still pay for an annual mammogram, the newer guidelines recommend a mammogram every two years for women of average risk.) Of course, you and your doctor may decide to screen more often for some diseases, based on your risk and your health status. Preventive services for you include: - Medicare offers their members a free annual wellness visit, which is time for you and your primary care provider to discuss and plan for your preventive service needs. Take advantage of this benefit every year! 
-All adults over the age of 72 should receive the recommended pneumonia vaccines. Current USPSTF guidelines recommend a series of two vaccines for the best pneumonia protection.  
-All adults should have a flu vaccine yearly and a tetanus vaccine every 10 years. All adults age 61 and older should receive a shingles vaccine once in their lifetime.   
-A bone mass density test is recommended when a woman turns 65 to screen for osteoporosis. This test is only recommended one time, as a screening. Some providers will use this same test as a disease monitoring tool if you already have osteoporosis. -All adults age 38-68 who are overweight should have a diabetes screening test once every three years.  
-Other screening tests and preventive services for persons with diabetes include: an eye exam to screen for diabetic retinopathy, a kidney function test, a foot exam, and stricter control over your cholesterol.  
-Cardiovascular screening for adults with routine risk involves an electrocardiogram (ECG) at intervals determined by your doctor.  
-Colorectal cancer screenings should be done for adults age 54-65 with no increased risk factors for colorectal cancer. There are a number of acceptable methods of screening for this type of cancer. Each test has its own benefits and drawbacks. Discuss with your doctor what is most appropriate for you during your annual wellness visit. The different tests include: colonoscopy (considered the best screening method), a fecal occult blood test, a fecal DNA test, and sigmoidoscopy. -Breast cancer screenings are recommended every other year for women of normal risk, age 54-69. 
-Cervical cancer screenings for women over age 72 are only recommended with certain risk factors.  
-All adults born between [facility] should be screened once for Hepatitis C. Here is a list of your current Health Maintenance items (your personalized list of preventive services) with a due date: 
Health Maintenance Due Topic Date Due  Shingles Vaccine (1 of 2) 09/18/1997

## 2018-11-28 NOTE — PROGRESS NOTES
HISTORY OF PRESENT ILLNESS  
HPI Fasting follow up hypercholesterolemia, hypothyroidism and labs. Overall has been getting along well and feeling well in general. 
No complaints or concerns at this time. Continues to follow healthy, well balanced diet, exercises regularly, goes on hiking trips several times a year w/ her  and family, does a lot of international travel. Going to [place] in 2-2019. She has her updated vaccines in her files at home and will scan them to her MyChart or send them to me. She will check on last Td and if >10yrs will get booster. REVIEW OF SYMPTOMS  
  Review of Systems Constitutional: Negative. HENT: Negative for hearing loss. Eyes: Negative. Respiratory: Negative. Cardiovascular: Negative. Gastrointestinal: Negative. Genitourinary: Negative. Musculoskeletal: Negative. Neurological: Negative. Endo/Heme/Allergies: Negative. Psychiatric/Behavioral: Negative.   
 
 
  
 PROBLEM LIST/MEDICAL HISTORY  
  
Problem List  Date Reviewed: 11/28/2018 Codes Class Noted ACP (advance care planning) ICD-10-CM: Z71.89 ICD-9-CM: V65.49  11/27/2017 Overview Signed 11/27/2017  9:51 AM by Priti Kirkpatrick MD  
  Patient has an AMD. ACP note  Hypothyroidism, adult ICD-10-CM: E03.9 ICD-9-CM: 244.9  11/20/2015 Overview Signed 11/20/2015 10:01 AM by Priti Kirkpatrick MD  
  Dx 2008; borderline tests 2003 S/P Fever of unknown origin (FUO), Suspected Viral Illness ICD-10-CM: R50.9 ICD-9-CM: 780.60  9/29/2014 Overview Signed 9/29/2014 12:51 PM by Priti Kirkpatrick MD  
  7/2014-8/2014: ID Dr Johnson Irizarry Mild Hypercholesterolemia with Excellent HDL ICD-10-CM: E78.00 ICD-9-CM: 272.0  11/14/2013 Screening exams for skin cancer ICD-10-CM: Z12.83 ICD-9-CM: V76.43  11/14/2013  Overview Signed 11/14/2013  3:11 PM by Priti Kirkpatrick MD  
 Derm: Previously Dr Alyx Hutchison, then Dr Cornejo Left Right Inguinal hernia, mild ICD-10-CM: K40.90 ICD-9-CM: 550.90  9/29/2010 Overview Signed 9/29/2010 10:00 AM by Ovidio Darling MD  
  6563'U S/P colonoscopy ICD-10-CM: X50.675 ICD-9-CM: V45.89  9/29/2010 Overview Signed 9/29/2010 10:01 AM by Ovidio Darling MD  
  2002 benign polyp; 2007, normal; f/u 5 yrs; Dr Paulo Loredo Family history of colon cancer ICD-10-CM: Z80.0 ICD-9-CM: V16.0  4/22/2010 Overview Signed 4/22/2010  9:50 AM by Willam Yates  
  sister Family history of breast cancer ICD-10-CM: Z80.3 ICD-9-CM: V16.3  4/22/2010 Overview Signed 4/22/2010  9:50 AM by Willam Yates  
  sister History of mild iron deficiency anemia ICD-10-CM: Z86.2 ICD-9-CM: V12.3  4/22/2010 Hypothyroidism ICD-10-CM: E03.9 ICD-9-CM: 244.9  4/22/2010 Overview Addendum 9/29/2010  9:59 AM by Ovidio Darling MD  
  Dx 2008; borderline tests 2003 Postmenopausal, LMP 2006, No HRT ICD-10-CM: Z78.0 ICD-9-CM: V49.81  4/22/2010 Overview Signed 4/22/2010  9:52 AM by Willam Yates LMP 10/2006 no HRT Benign SQ Mass on right back ICD-10-CM: R22.2 ICD-9-CM: 782.2  4/22/2010 Overview Signed 4/22/2010  9:53 AM by Willam Yates  
  1980's R low back mass Lipoma vs Cyst 
  
  
   
  
 
 
  PAST SURGICAL HISTORY  
   
Past Surgical History:  
Procedure Laterality Date [address]  
 negative, left breast; Dr Ana Kong  COLONOSCOPY  2002  
 benign polyp; Dr Paulo Loredo  COLONOSCOPY  11/2007  
 normal; ok x 5yrs; Dr Adolph Hernández; f/u 5 yrs  DEXA BONE DENSITY STUDY AXIAL  10/2001  
 normal  
 DEXA BONE DENSITY STUDY AXIAL  10/2007  
 normal  
 EKG ORDERABLE  9/2008 Sinus bradycardia  EKG ORDERABLE  9/2009 Sinus bradycardia  HM DEXA SCAN  10/2009, 10/2011 VPI (scanned reports)  HX BREAST BIOPSY Left 1982  
 years ago; neg  
  HX COLONOSCOPY  1/17/13, Dr Leann Miguel Internal Hemorrhoids; repeat 5 yrs [address]  HX VEIN STRIPPING  1982  
 varicose veins, Dr Joe Jung  HX VEIN STRIPPING Left 06/2016  
 for varicose veins  XR HIP RT W OR WO PELV 2-3 VWS  9/2008  
 normal  
 
 
 
MEDICATIONS  
  
Current Outpatient Medications Medication Sig  levothyroxine (SYNTHROID) 25 mcg tablet TAKE 1 TABLET EVERY DAY  BEFORE  BREAKFAST  CHOLECALCIFEROL, VITAMIN D3, (VITAMIN D3 PO) Take 2,000 Units by mouth Three (3) times a week.  calcium-vitamin D (CALCIUM 500+D) 500 mg(1,250mg) -200 unit per tablet Take 1 Tab by mouth two (2) times daily (with meals). No current facility-administered medications for this visit. ALLERGIES Allergies Allergen Reactions  Pcn [Penicillins] Other (comments) As child-can't remember SOCIAL HISTORY  
   
Social History Socioeconomic History  Marital status:  Spouse name: Not on file  Number of children: 3  
 Years of education: Not on file  Highest education level: Not on file Social Needs  Financial resource strain: Not on file  Food insecurity - worry: Not on file  Food insecurity - inability: Not on file  Transportation needs - medical: Not on file  Transportation needs - non-medical: Not on file Occupational History  Occupation: Homemaker Tobacco Use  Smoking status: Never Smoker  Smokeless tobacco: Never Used Substance and Sexual Activity  Alcohol use: Yes Comment: 1 glass wine 2-3 times/week  Drug use: No  
 Sexual activity: Yes  
  Partners: Male Other Topics Concern   Service No  
 Blood Transfusions No  
 Caffeine Concern No  
  Comment: none, except occ chocolate  Occupational Exposure No  
 Hobby Hazards No  
 Sleep Concern No  
 Stress Concern No  
 Weight Concern No  
  Comment: pretty stable, usually runs ~ 120 range  Special Diet No  
 Comment: sensible diet, well balanced  Back Care No  
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   
 Patient and  have an Advance Directive.  
 
  
IMMUNIZATIONS Immunization History Administered Date(s) Administered  Hep A Vaccine 2013  Hep A Vaccine (Adult) 2014  Influenza High Dose Vaccine PF 2016, 11/10/2017, 09/15/2018  Influenza Vaccine 10/15/2013, 10/14/2014, 10/31/2015  Influenza Vaccine Split 10/10/2012  Influenza Vaccine Whole 10/04/2011  Pneumococcal Conjugate (PCV-13) 2016  Pneumococcal Polysaccharide (PPSV-23) 10/01/2010, 2015  TD Vaccine 2008  Zoster Vaccine, Live 2003 FAMILY HISTORY Family History Problem Relation Age of Onset  Cancer Mother   
     lung cancer  Hypertension Mother  Arthritis-osteo Mother   
     obese  Diabetes Father  Cancer Father   
     bladder cancer and BCC skin cancer  Colon Cancer Sister   
      age 52  Arthritis-osteo Sister  Breast Cancer Sister   
     diagnosed age ~ 62, survivor  Depression Son  Anxiety Son  Breast Cancer Other   
     great aunt [place] Visit Vitals /60 (BP 1 Location: Left arm, BP Patient Position: Sitting) Pulse 60 Temp 97.6 °F (36.4 °C) (Oral) Resp 18 Ht 5' 4.5\" (1.638 m) Wt 118 lb (53.5 kg) SpO2 97% BMI 19.94 kg/m² PHYSICAL EXAMINATION  
  Physical Exam  
Constitutional: She is oriented to person, place, and time and well-developed, well-nourished, and in no distress.   
HENT:  
Right Ear: Tympanic membrane normal.  
Left Ear: Tympanic membrane normal.  
Nose: Nose normal.  
 Mouth/Throat: Oropharynx is clear and moist. No oropharyngeal exudate. Eyes: Conjunctivae and EOM are normal. Pupils are equal, round, and reactive to light. Neck: Neck supple. Carotid bruit is not present. No thyromegaly present. Cardiovascular: Normal rate, regular rhythm and normal heart sounds. No murmur heard. Pulmonary/Chest: Effort normal and breath sounds normal.  
Abdominal: Soft. Bowel sounds are normal. She exhibits no distension and no mass. There is no tenderness. Musculoskeletal: Normal range of motion. She exhibits no edema or tenderness. Lymphadenopathy:  
  She has no cervical adenopathy. Neurological: She is alert and oriented to person, place, and time. Gait normal. Coordination normal.  
Skin: Skin is warm. Psychiatric: Mood, affect and judgment normal.  
Vitals reviewed. 
 
 
  
 
 
 ASSESSMENT & PLAN  
 
  ICD-10-CM ICD-9-CM 1. Medicare annual wellness visit, subsequent Z00.00 V70.0 See separate note same encounter 2. Mild Hypercholesterolemia with Excellent HDL E78.00 272.0 CBC W/O DIFF  
   LIPID PANEL  
   METABOLIC PANEL, COMPREHENSIVE URINALYSIS W/ RFLX MICROSCOPIC 3. Hypothyroidism, adult E03.9 244.9 CBC W/O DIFF  
   LIPID PANEL  
   METABOLIC PANEL, COMPREHENSIVE  
   TSH 3RD GENERATION  
   T4, FREE Fasting labs today Cardiovascular risk and specific lipid/LDL goals reviewed Reviewed diet, nutrition, exercise, weight management, BMI/goals, age/risk based screening recommendations, health maintenance & prevention counseling. Further follow up & other recommendations pending review of labs.  If all remains good and stable, follow up in 1 yr, sooner prn

## 2018-11-28 NOTE — PROGRESS NOTES
This is the Subsequent Medicare Annual Wellness Exam, performed 12 months or more after the Initial AWV or the last Subsequent AWV I have reviewed the patient's medical history in detail and updated the computerized patient record. History Past Medical History:  
Diagnosis Date  ACP (advance care planning) 2017 Patient has an AMD. ACP note   Benign SQ Mass on right back 2010  Family history of breast cancer 2010  Family history of colon cancer 2010  
 History of mild iron deficiency anemia 2010  Hypothyroidism 2010  Iron deficiency anemia 2010  Mass on back 2010  Mild Hypercholesterolemia with Excellent HDL 2013  Postmenopausal 2010  Postmenopausal, LMP , No HRT 2010  Right Inguinal hernia, mild 2010  S/P colonoscopy 2010  S/P Fever of unknown origin (FUO), Suspected Viral Illness 2014-2014: ID Dr Nicole GARCIA (spontaneous )  Screening exams for skin cancer 2013 Derm: Previously Dr Ana Maria Perdomo, then Dr Ej Kyle Past Surgical History:  
Procedure Laterality Date [address]  
 negative, left breast; Dr Livia Mora  COLONOSCOPY    
 benign polyp; Dr Umair Chavarria  COLONOSCOPY  2007  
 normal; ok x 5yrs; Dr Alexandra Luna; f/u 5 yrs  DEXA BONE DENSITY STUDY AXIAL  10/2001  
 normal  
 DEXA BONE DENSITY STUDY AXIAL  10/2007  
 normal  
 EKG ORDERABLE  2008 Sinus bradycardia  EKG ORDERABLE  2009 Sinus bradycardia  HM DEXA SCAN  10/2009, 10/2011 VPI (scanned reports)  HX BREAST BIOPSY Left   
 years ago; neg  HX COLONOSCOPY  13, Dr Kelly Georges Internal Hemorrhoids; repeat 5 yrs Andres GARCIA  HX VEIN STRIPPING    
 varicose veins, Dr Livia Mora  HX VEIN STRIPPING Left 2016  
 for varicose veins  XR HIP RT W OR WO PELV 2-3 VWS  2008  
 normal  
 
 Current Outpatient Medications Medication Sig Dispense Refill  levothyroxine (SYNTHROID) 25 mcg tablet TAKE 1 TABLET EVERY DAY  BEFORE  BREAKFAST 90 Tab 3  
 CHOLECALCIFEROL, VITAMIN D3, (VITAMIN D3 PO) Take 2,000 Units by mouth Three (3) times a week.  calcium-vitamin D (CALCIUM 500+D) 500 mg(1,250mg) -200 unit per tablet Take 1 Tab by mouth two (2) times daily (with meals). Allergies Allergen Reactions  Pcn [Penicillins] Other (comments) As child-can't remember Family History Problem Relation Age of Onset  Cancer Mother   
     lung cancer  Hypertension Mother  Arthritis-osteo Mother   
     obese  Diabetes Father  Cancer Father   
     bladder cancer and BCC skin cancer  Colon Cancer Sister   
      age 52  Arthritis-osteo Sister  Breast Cancer Sister   
     diagnosed age ~ 62, survivor  Depression Son  Anxiety Son  Breast Cancer Other   
     great aunt Social History Tobacco Use  Smoking status: Never Smoker  Smokeless tobacco: Never Used Substance Use Topics  Alcohol use: Yes Comment: 1 glass wine 2-3 times/week Patient Active Problem List  
Diagnosis Code  Family history of colon cancer Z80.0  Family history of breast cancer Z80.3  History of mild iron deficiency anemia Z86.2  Hypothyroidism E03.9  Postmenopausal, LMP 2006, No HRT Z78.0  Benign SQ Mass on right back R22.2  Right Inguinal hernia, mild K40.90  
 S/P colonoscopy Z98.890  
 Mild Hypercholesterolemia with Excellent HDL E78.00  Screening exams for skin cancer Z12.83  
 S/P Fever of unknown origin (FUO), Suspected Viral Illness R50.9  Hypothyroidism, adult E03.9  ACP (advance care planning) Z71.89 Depression Risk Factor Screening: PHQ over the last two weeks 2018 Little interest or pleasure in doing things Not at all Feeling down, depressed, irritable, or hopeless Not at all Total Score PHQ 2 0 Alcohol Risk Factor Screening: On any occasion in the past three months you have had more than 3 drinks containing alcohol. Functional Ability and Level of Safety:  
Hearing Loss Hearing is good. Activities of Daily Living The home contains: no safety equipment. Patient does total self care Fall Risk Fall Risk Assessment, last 12 mths 11/28/2018 Able to walk? Yes Fall in past 12 months? No  
 
 
Abuse Screen Patient is not abused Cognitive Screening Evaluation of Cognitive Function: 
Has your family/caregiver stated any concerns about your memory: no 
 
 
Patient Care Team  
Patient Care Team: 
Krystal Nair MD as PCP - General 
 
Assessment/Plan Education and counseling provided: 
Are appropriate based on today's review and evaluation Diagnoses and all orders for this visit: 
 
1. Medicare annual wellness visit, subsequent Health Maintenance Due Topic Date Due  Shingrix Vaccine Age 50> (1 of 2) 09/18/1997

## 2018-11-29 LAB
ALBUMIN SERPL-MCNC: 4.4 G/DL (ref 3.5–4.8)
ALBUMIN/GLOB SERPL: 1.7 {RATIO} (ref 1.2–2.2)
ALP SERPL-CCNC: 56 IU/L (ref 39–117)
ALT SERPL-CCNC: 16 IU/L (ref 0–32)
APPEARANCE UR: CLEAR
AST SERPL-CCNC: 24 IU/L (ref 0–40)
BILIRUB SERPL-MCNC: 0.6 MG/DL (ref 0–1.2)
BILIRUB UR QL STRIP: NEGATIVE
BUN SERPL-MCNC: 15 MG/DL (ref 8–27)
BUN/CREAT SERPL: 16 (ref 12–28)
CALCIUM SERPL-MCNC: 9.8 MG/DL (ref 8.7–10.3)
CHLORIDE SERPL-SCNC: 102 MMOL/L (ref 96–106)
CHOLEST SERPL-MCNC: 226 MG/DL (ref 100–199)
CO2 SERPL-SCNC: 31 MMOL/L (ref 20–29)
COLOR UR: YELLOW
CREAT SERPL-MCNC: 0.94 MG/DL (ref 0.57–1)
ERYTHROCYTE [DISTWIDTH] IN BLOOD BY AUTOMATED COUNT: 12.8 % (ref 12.3–15.4)
GLOBULIN SER CALC-MCNC: 2.6 G/DL (ref 1.5–4.5)
GLUCOSE SERPL-MCNC: 81 MG/DL (ref 65–99)
GLUCOSE UR QL: NEGATIVE
HCT VFR BLD AUTO: 40.6 % (ref 34–46.6)
HDLC SERPL-MCNC: 88 MG/DL
HGB BLD-MCNC: 13.6 G/DL (ref 11.1–15.9)
HGB UR QL STRIP: NEGATIVE
INTERPRETATION, 910389: NORMAL
KETONES UR QL STRIP: NEGATIVE
LDLC SERPL CALC-MCNC: 126 MG/DL (ref 0–99)
LEUKOCYTE ESTERASE UR QL STRIP: NEGATIVE
MCH RBC QN AUTO: 31.8 PG (ref 26.6–33)
MCHC RBC AUTO-ENTMCNC: 33.5 G/DL (ref 31.5–35.7)
MCV RBC AUTO: 95 FL (ref 79–97)
MICRO URNS: NORMAL
NITRITE UR QL STRIP: NEGATIVE
PH UR STRIP: 6 [PH] (ref 5–7.5)
PLATELET # BLD AUTO: 203 X10E3/UL (ref 150–379)
POTASSIUM SERPL-SCNC: 4.2 MMOL/L (ref 3.5–5.2)
PROT SERPL-MCNC: 7 G/DL (ref 6–8.5)
PROT UR QL STRIP: NEGATIVE
RBC # BLD AUTO: 4.28 X10E6/UL (ref 3.77–5.28)
SODIUM SERPL-SCNC: 143 MMOL/L (ref 134–144)
SP GR UR: 1.01 (ref 1–1.03)
T4 FREE SERPL-MCNC: 1.13 NG/DL (ref 0.82–1.77)
TRIGL SERPL-MCNC: 59 MG/DL (ref 0–149)
TSH SERPL DL<=0.005 MIU/L-ACNC: 3.08 UIU/ML (ref 0.45–4.5)
UROBILINOGEN UR STRIP-MCNC: 0.2 MG/DL (ref 0.2–1)
VLDLC SERPL CALC-MCNC: 12 MG/DL (ref 5–40)
WBC # BLD AUTO: 5 X10E3/UL (ref 3.4–10.8)

## 2018-12-06 DIAGNOSIS — Z12.31 ENCOUNTER FOR SCREENING MAMMOGRAM FOR BREAST CANCER: Primary | ICD-10-CM

## 2019-01-18 ENCOUNTER — TELEPHONE (OUTPATIENT)
Dept: FAMILY MEDICINE CLINIC | Age: 74
End: 2019-01-18

## 2019-01-18 DIAGNOSIS — Z23 NEED FOR SHINGLES VACCINE: Primary | ICD-10-CM

## 2019-01-18 NOTE — TELEPHONE ENCOUNTER
Regarding: RE: Update Medical Information  Contact: [phone]  ----- Message from [address] [place] [address] [address], [facility] sent at 1/17/2019  5:13 PM EST -----    I got the Td Vaccine (Tetanus and Diphtheria), not Tdap. Mine was the booster given every 10 years, not the one given children that includes whooping cough as well. [facility] did not say that I need a prescription to get the shingrix; they were willing to give it to me if I was willing to pay ~$167 per shot, with two shots. They said that if Dr. Daniela Blank 'prescribed' it for me in writing, they could file with my insurance. ----- Message -----  From: Johanna Burden LPN  Sent: 3/95/0328 12:46 PM EST  To: Hawk Shook  Subject: RE: Update Medical Information  I don't know why [facility] said you needed an rx for the shingrix. You can go to any pharmacy to get it. The only problem is some insurances pay for it and some don't. as for the tetanus, did you get a plain tetanus or the Tdap? Thanks for letting me know. MJ    ----- Message -----     From: Hawk Shook     Sent: 1/16/2019  3:23 PM EST       To: Eneida Burden MD  Subject: Update Medical Information    Dr Daniela Blank, today, January 16, 2019, I got a tetanus (T-d) shot which you had recommended, so you can add that to my profile. I got it at [facility] and while I was there, I asked about the Shingrix vaccine. They said I should ask you for a prescription for that so that when I get it, they can file with Medicare and my insurance for it. Will you please write me a prescription for Shingrix and either send it to me or to [facility] and let me know about it? Thank so much.      Joshua Clifton

## 2019-01-23 RX ORDER — LEVOTHYROXINE SODIUM 25 UG/1
TABLET ORAL
Qty: 90 TAB | Refills: 3 | Status: SHIPPED | OUTPATIENT
Start: 2019-01-23 | End: 2020-01-09 | Stop reason: SDUPTHER

## 2019-01-23 NOTE — TELEPHONE ENCOUNTER
Future Appointments:         Future Appointments   Date Time Provider Juanjose Angel   2/4/2019  9:30 AM EJ [facility] 1 [address] [facility]   12/4/2019  9:00 AM Balaji Jones  tacho Rivero [facility]

## 2019-02-04 ENCOUNTER — HOSPITAL ENCOUNTER (OUTPATIENT)
Dept: MAMMOGRAPHY | Age: 74
Discharge: HOME | End: 2019-02-04
Attending: FAMILY MEDICINE
Payer: MEDICARE

## 2019-02-04 DIAGNOSIS — Z12.31 ENCOUNTER FOR SCREENING MAMMOGRAM FOR BREAST CANCER: ICD-10-CM

## 2019-02-04 PROCEDURE — 77067 SCR MAMMO BI INCL CAD: CPT

## 2019-12-04 ENCOUNTER — OFFICE VISIT (OUTPATIENT)
Dept: FAMILY MEDICINE CLINIC | Age: 74
End: 2019-12-04

## 2019-12-04 ENCOUNTER — HOSPITAL ENCOUNTER (OUTPATIENT)
Dept: LAB | Age: 74
Discharge: HOME | End: 2019-12-04
Payer: MEDICARE

## 2019-12-04 VITALS
OXYGEN SATURATION: 93 % | DIASTOLIC BLOOD PRESSURE: 72 MMHG | WEIGHT: 118 LBS | HEART RATE: 56 BPM | TEMPERATURE: 98.1 F | BODY MASS INDEX: 19.66 KG/M2 | SYSTOLIC BLOOD PRESSURE: 110 MMHG | HEIGHT: 65 IN | RESPIRATION RATE: 16 BRPM

## 2019-12-04 DIAGNOSIS — E03.9 HYPOTHYROIDISM, ADULT: ICD-10-CM

## 2019-12-04 DIAGNOSIS — E78.00 HYPERCHOLESTEROLEMIA: ICD-10-CM

## 2019-12-04 DIAGNOSIS — Z00.00 MEDICARE ANNUAL WELLNESS VISIT, SUBSEQUENT: Primary | ICD-10-CM

## 2019-12-04 PROCEDURE — 85027 COMPLETE CBC AUTOMATED: CPT

## 2019-12-04 PROCEDURE — 80061 LIPID PANEL: CPT

## 2019-12-04 PROCEDURE — 80053 COMPREHEN METABOLIC PANEL: CPT

## 2019-12-04 PROCEDURE — 84443 ASSAY THYROID STIM HORMONE: CPT

## 2019-12-04 NOTE — PATIENT INSTRUCTIONS
Medicare Wellness Visit, Female The best way to live healthy is to have a lifestyle where you eat a well-balanced diet, exercise regularly, limit alcohol use, and quit all forms of tobacco/nicotine, if applicable. Regular preventive services are another way to keep healthy. Preventive services (vaccines, screening tests, monitoring & exams) can help personalize your care plan, which helps you manage your own care. Screening tests can find health problems at the earliest stages, when they are easiest to treat. [facility] follows the current, evidence-based guidelines published by the [facility] ([facility]) when recommending preventive services for our patients. Because we follow these guidelines, sometimes recommendations change over time as research supports it. (For example, mammograms used to be recommended annually. Even though Medicare will still pay for an annual mammogram, the newer guidelines recommend a mammogram every two years for women of average risk). Of course, you and your doctor may decide to screen more often for some diseases, based on your risk and your co-morbidities (chronic disease you are already diagnosed with). Preventive services for you include: - Medicare offers their members a free annual wellness visit, which is time for you and your primary care provider to discuss and plan for your preventive service needs. Take advantage of this benefit every year! 
-All adults over the age of 72 should receive the recommended pneumonia vaccines. Current USPSTF guidelines recommend a series of two vaccines for the best pneumonia protection.  
-All adults should have a flu vaccine yearly and a tetanus vaccine every 10 years.  
-All adults age 48 and older should receive the shingles vaccines (series of two vaccines). -All adults age 38-68 who are overweight should have a diabetes screening test once every three years. -All adults born between [birth year] and 1963 should be screened once for Hepatitis C. 
-Other screening tests and preventive services for persons with diabetes include: an eye exam to screen for diabetic retinopathy, a kidney function test, a foot exam, and stricter control over your cholesterol.  
-Cardiovascular screening for adults with routine risk involves an electrocardiogram (ECG) at intervals determined by your doctor.  
-Colorectal cancer screenings should be done for adults age 54-65 with no increased risk factors for colorectal cancer. There are a number of acceptable methods of screening for this type of cancer. Each test has its own benefits and drawbacks. Discuss with your doctor what is most appropriate for you during your annual wellness visit. The different tests include: colonoscopy (considered the best screening method), a fecal occult blood test, a fecal DNA test, and sigmoidoscopy. 
 
-A bone mass density test is recommended when a woman turns 65 to screen for osteoporosis. This test is only recommended one time, as a screening. Some providers will use this same test as a disease monitoring tool if you already have osteoporosis. -Breast cancer screenings are recommended every other year for women of normal risk, age 54-69. 
-Cervical cancer screenings for women over age 72 are only recommended with certain risk factors. Here is a list of your current Health Maintenance items (your personalized list of preventive services) with a due date: There are no preventive care reminders to display for this patient.

## 2019-12-04 NOTE — PROGRESS NOTES
This is the Subsequent Medicare Annual Wellness Exam, performed 12 months or more after the Initial AWV or the last Subsequent AWV    I have reviewed the patient's medical history in detail and updated the computerized patient record.      History     Patient Active Problem List   Diagnosis Code    Family history of colon cancer Z80.0    Family history of breast cancer Z80.3    History of mild iron deficiency anemia Z86.2    Hypothyroidism E03.9    Postmenopausal, LMP 2006, No HRT Z78.0    Benign SQ Mass on right back R22.2    Right Inguinal hernia, mild K40.90    S/P colonoscopy Z98.890    Mild Hypercholesterolemia with Excellent HDL E78.00    Screening exams for skin cancer Z12.83    S/P Fever of unknown origin (FUO), Suspected Viral Illness R50.9    Hypothyroidism, adult E03.9    ACP (advance care planning) Z71.89     Past Medical History:   Diagnosis Date    ACP (advance care planning) 2017    Patient has an AMD. ACP note     Benign SQ Mass on right back 2010    Family history of breast cancer 2010    Family history of colon cancer 2010    History of mild iron deficiency anemia 2010    Hypothyroidism 2010    Iron deficiency anemia 2010    Mass on back 2010    Mild Hypercholesterolemia with Excellent HDL 2013    Postmenopausal 2010    Postmenopausal, LMP 2006, No HRT 2010    Right Inguinal hernia, mild 2010    S/P colonoscopy 2010    S/P Fever of unknown origin (FUO), Suspected Viral Illness 2014-2014: ID Dr Augie Amos (spontaneous )     Screening exams for skin cancer 2013    Derm: Previously Dr Vin Dooley, then Dr Ishmael Irwin      Past Surgical History:   Procedure Laterality Date    BIOPSY BREAST      negative, left breast; Dr Elena Dickey      benign polyp; Dr Dorothy Taylor  2007    normal; ok x 5yrs; Dr Laney Recio; f/u 5 yrs    DEXA BONE DENSITY STUDY AXIAL  10/2001    normal    DEXA BONE DENSITY STUDY AXIAL  10/2007    normal    EKG ORDERABLE  2008    Sinus bradycardia    EKG ORDERABLE  2009    Sinus bradycardia    HM DEXA SCAN  10/2009, 10/2011    VPI (scanned reports)    HX BREAST BIOPSY Left 1982    years ago; neg    HX COLONOSCOPY  2013    Internal Hemorrhoids; repeat 5 yrs due to fam hx; Dr Alvarez Counter HX COLONOSCOPY  2018    Internal Hemorrhoids, Polyp; repeat 5 yrs due to fam hx; Dr Pastor El    varicose veins, Dr Madison Adames Left 2016    for varicose veins    XR HIP RT W OR WO PELV 2-3 VWS  2008    normal     Current Outpatient Medications   Medication Sig Dispense Refill    levothyroxine (SYNTHROID) 25 mcg tablet TAKE 1 TABLET EVERY DAY  BEFORE  BREAKFAST 90 Tab 3    CHOLECALCIFEROL, VITAMIN D3, (VITAMIN D3 PO) Take 2,000 Units by mouth Three (3) times a week.  calcium-vitamin D (CALCIUM 500+D) 500 mg(1,250mg) -200 unit per tablet Take 1 Tab by mouth two (2) times daily (with meals).  Takes a few x a week       Allergies   Allergen Reactions    Pcn [Penicillins] Other (comments)     As child-can't remember       Family History   Problem Relation Age of Onset    Cancer Mother         lung cancer    Hypertension Mother    [employer] Arthritis-osteo Mother         obese    Diabetes Father     Cancer Father         bladder cancer and BCC skin cancer    Colon Cancer Sister          age 52    [de-identified] Sister     Breast Cancer Sister         diagnosed age ~ 62, survivor    Depression Son     Anxiety Son     Breast Cancer Other         great aunt     Social History     Tobacco Use    Smoking status: Never Smoker    Smokeless tobacco: Never Used   Substance Use Topics    Alcohol use: Yes     Comment: 1 glass wine 2-3 times/week       Depression Risk Factor Screening:     3 most recent PHQ Screens 2019   Little interest or pleasure in doing things Not at all   Feeling down, depressed, irritable, or hopeless Not at all   Total Score PHQ 2 0       Alcohol Risk Factor Screening:   Do you average 1 drink per night or more than 7 drinks a week:  No    On any one occasion in the past three months have you have had more than 3 drinks containing alcohol:  No      Functional Ability and Level of Safety:   Hearing: Hearing is good. Activities of Daily Living: The home contains: no safety equipment. Patient does total self care  ADL Assessment 12/4/2019   Feeding yourself No Help Needed   Getting from bed to chair No Help Needed   Getting dressed No Help Needed   Bathing or showering No Help Needed   Walk across the room (includes cane/walker) No Help Needed   Using the telphone No Help Needed   Taking your medications No Help Needed   Preparing meals No Help Needed   Managing money (expenses/bills) No Help Needed   Moderately strenuous housework (laundry) No Help Needed   Shopping for personal items (toiletries/medicines) No Help Needed   Shopping for groceries No Help Needed   Driving No Help Needed   Climbing a flight of stairs No Help Needed   Getting to places beyond walking distances No Help Needed         Ambulation: with no difficulty    Fall Risk:  Fall Risk Assessment, last 12 mths 12/4/2019   Able to walk? Yes   Fall in past 12 months? No       Abuse Screen:  Patient is not abused    Cognitive Screening   Has your family/caregiver stated any concerns about your memory: no      Patient Care Team   Patient Care Team:  Gasper Benavidez MD as PCP - General  Gasper Benavidez MD as PCP - [facility] Empaneled Provider    Assessment/Plan   Education and counseling provided:  Are appropriate based on today's review and evaluation    Diagnoses and all orders for this visit:    1. Medicare annual wellness visit, subsequent        There are no preventive care reminders to display for this patient.

## 2019-12-04 NOTE — PROGRESS NOTES
Chief Complaint   Patient presents with   [facility] Annual Wellness Visit         Cholesterol Problem     fasting    Thyroid Problem     follow up       HISTORY OF PRESENT ILLNESS   HPI  Fasting follow up hypercholesterolemia and hypothyroidism. Overall getting along well and feeling well in general.  Mood and energy good. Follows healthy, low fat diet. Exercises regularly and stays very active. Enjoys hiking w/ her . They travel a lot internationally as well. Immunizations up to date. REVIEW OF SYMPTOMS   Review of Systems   Constitutional: Negative. HENT: Negative. Daughter is an audiologist and did a hearing screen on her 11-28-19, normal   Eyes: Negative. Up to date on complete eye exam, reportedly normal   Respiratory: Negative. Cardiovascular: Negative. Negative for chest pain, palpitations and leg swelling. Gastrointestinal: Negative. Skin:        Sees derm annually for skin checks and cancer screenings   Neurological: Negative. Endo/Heme/Allergies: Negative.             PROBLEM LIST/MEDICAL HISTORY     Problem List  Date Reviewed: 12/4/2019          Codes Class Noted    ACP (advance care planning) ICD-10-CM: Z71.89  ICD-9-CM: V65.49  11/27/2017    Overview Signed 11/27/2017  9:51 AM by Michele Yee MD     Patient has an AMD. ACP note              Hypothyroidism, adult ICD-10-CM: E03.9  ICD-9-CM: 244.9  11/20/2015    Overview Signed 11/20/2015 10:01 AM by Michele Yee MD     Dx 2008; borderline tests 2003             S/P Fever of unknown origin (FUO), Suspected Viral Illness ICD-10-CM: R50.9  ICD-9-CM: 780.60  9/29/2014    Overview Signed 9/29/2014 12:51 PM by Michele Yee MD     7/2014-8/2014: ID Dr Eva Recio             Mild Hypercholesterolemia with Excellent HDL ICD-10-CM: E78.00  ICD-9-CM: 272.0  11/14/2013        Screening exams for skin cancer ICD-10-CM: Z12.83  ICD-9-CM: V76.43  11/14/2013    Overview Signed 11/14/2013 3:11 PM by Damien Alpers, MD     Derm: Previously Dr Any Campa, then Dr Marco Antonio Navarro             Right Inguinal hernia, mild ICD-10-CM: K40.90  ICD-9-CM: 550.90  9/29/2010    Overview Signed 9/29/2010 10:00 AM by Damien Alpers, MD     7826'G             S/P colonoscopy ICD-10-CM: A46.771  ICD-9-CM: V45.89  9/29/2010    Overview Signed 9/29/2010 10:01 AM by Damien Alpers, MD     2002 benign polyp; 2007, normal; f/u 5 yrs; Dr Ernestine Garcia             Family history of colon cancer ICD-10-CM: Z80.0  ICD-9-CM: V16.0  4/22/2010    Overview Signed 4/22/2010  9:50 AM by Ede Goodwin     sister             Family history of breast cancer ICD-10-CM: Z80.3  ICD-9-CM: V16.3  4/22/2010    Overview Signed 4/22/2010  9:50 AM by Ede Goodwin     sister             History of mild iron deficiency anemia ICD-10-CM: Z86.2  ICD-9-CM: V12.3  4/22/2010        Hypothyroidism ICD-10-CM: E03.9  ICD-9-CM: 244.9  4/22/2010    Overview Addendum 9/29/2010  9:59 AM by Damien Alpers, MD     Dx 2008; borderline tests 2003             Postmenopausal, LMP 2006, No HRT ICD-10-CM: Z78.0  ICD-9-CM: V49.81  4/22/2010    Overview Signed 4/22/2010  9:52 AM by Esaw Buddy     LMP 10/2006 no HRT             Benign SQ Mass on right back ICD-10-CM: R22.2  ICD-9-CM: 782.2  4/22/2010    Overview Signed 4/22/2010  9:53 AM by Esaw Buddy     1980's R low back mass Lipoma vs Cyst                       PAST SURGICAL HISTORY     Past Surgical History:   Procedure Laterality Date    BIOPSY BREAST  1981    negative, left breast; Dr Destiny Lee  2002    benign polyp; Dr Karel Lujan  11/2007    normal; ok x 5yrs; Dr Alvarez Dach; f/u 5 yrs    DEXA BONE DENSITY STUDY AXIAL  10/2001    normal    DEXA BONE DENSITY STUDY AXIAL  10/2007    normal    EKG ORDERABLE  9/2008    Sinus bradycardia    EKG ORDERABLE  9/2009    Sinus bradycardia    HM DEXA SCAN  10/2009, 10/2011    VPI (scanned reports)    HX BREAST BIOPSY Left 1982    years ago; neg    HX COLONOSCOPY  01/17/2013    Internal Hemorrhoids; repeat 5 yrs due to fam hx; Dr Geroldine Kussmaul HX COLONOSCOPY  02/2018    Internal Hemorrhoids, Polyp; repeat 5 yrs due to fam hx; Dr Katalina Michelle    varicose veins, Dr Corby Begum Left 06/2016    for varicose veins    XR HIP RT W OR WO PELV 2-3 VWS  9/2008    normal         MEDICATIONS     Current Outpatient Medications   Medication Sig    levothyroxine (SYNTHROID) 25 mcg tablet TAKE 1 TABLET EVERY DAY  BEFORE  BREAKFAST    CHOLECALCIFEROL, VITAMIN D3, (VITAMIN D3 PO) Take 2,000 Units by mouth Three (3) times a week.  calcium-vitamin D (CALCIUM 500+D) 500 mg(1,250mg) -200 unit per tablet Take 1 Tab by mouth two (2) times daily (with meals). Takes a few x a week     No current facility-administered medications for this visit.            ALLERGIES     Allergies   Allergen Reactions    Pcn [Penicillins] Other (comments)     As child-can't remember          SOCIAL HISTORY     Social History     Socioeconomic History    Marital status:      Spouse name: Not on file    Number of children: 3    Years of education: Not on file    Highest education level: Not on file   Occupational History    Occupation: Homemaker   Tobacco Use    Smoking status: Never Smoker    Smokeless tobacco: Never Used   Substance and Sexual Activity    Alcohol use: Yes     Comment: 1 glass wine 2-3 times/week    Drug use: No    Sexual activity: Yes     Partners: Male   Other Topics Concern     Service No    Blood Transfusions No    Caffeine Concern No     Comment: none, except occ chocolate    Occupational Exposure No    Hobby Hazards No    Sleep Concern No    Stress Concern No    Weight Concern No     Comment: pretty stable, usually runs ~ 120 range    Special Diet No     Comment: sensible diet, well balanced    Back Care No    Exercise Yes Comment: ryan berg w/ , walks regularly ~ 45minutes to an hour almost every day; tennis seasonally ~ 2x a week; occ bikes    Bike Helmet Yes    Seat Belt Yes    Self-Exams Yes   Social History Narrative    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 IMMUNIZATIONS  Immunization History   Administered Date(s) Administered    Hep A Vaccine 2013    Hep A Vaccine (Adult) 2014    Influenza High Dose Vaccine PF 2016, 11/10/2017, 09/15/2018, 10/19/2019    Influenza Vaccine 10/15/2013, 10/14/2014, 10/31/2015    Influenza Vaccine Split 10/10/2012    Influenza Vaccine Whole 10/04/2011    Pneumococcal Conjugate (PCV-13) 2016    Pneumococcal Polysaccharide (PPSV-23) 10/01/2010, 2015    TD Vaccine 2008    Td 2019    Typhoid Vi Capsular Polysaccharide Vaccine 2013    Yellow Fever Vaccine 2013    Zoster Recombinant 2019, 2019    Zoster Vaccine, Live 2003         FAMILY HISTORY     Family History   Problem Relation Age of Onset    Cancer Mother         lung cancer    Hypertension Mother    Meza Noon Arthritis-osteo Mother         obese    Diabetes Father     Cancer Father         bladder cancer and BCC skin cancer    Colon Cancer Sister          age 52    [de-identified] Sister    Meza Noon Breast Cancer Sister         diagnosed age ~ 62, survivor    Depression Son     Anxiety Son     Breast Cancer Other         great aunt         VITALS     Visit Vitals  /72 (BP 1 Location: Right arm, BP Patient Position: Sitting)   Pulse (!) 56   Temp 98.1 °F (36.7 °C) (Oral)   Resp 16   Ht 5' 4.5\" (1.638 m)   Wt 118 lb (53.5 kg)   SpO2 93%   BMI 19.94 kg/m²          PHYSICAL EXAMINATION   Physical Exam  Vitals signs reviewed. Constitutional:       Appearance: Normal appearance.    HENT:      Right Ear: Tympanic membrane normal.      Left Ear: Tympanic membrane normal.      Mouth/Throat:      Mouth: Mucous membranes are moist.      Pharynx: Oropharynx is clear. Neck:      Musculoskeletal: Neck supple. Thyroid: No thyroid mass, thyromegaly or thyroid tenderness. Vascular: No carotid bruit. Cardiovascular:      Rate and Rhythm: Normal rate and regular rhythm. Heart sounds: Normal heart sounds. No murmur. No gallop. Pulmonary:      Effort: Pulmonary effort is normal.      Breath sounds: Normal breath sounds. Abdominal:      General: There is no distension. Palpations: Abdomen is soft. There is no mass. Tenderness: There is no tenderness. Musculoskeletal:         General: No tenderness. Right lower leg: No edema. Left lower leg: No edema. Lymphadenopathy:      Cervical: No cervical adenopathy. Neurological:      General: No focal deficit present. Gait: Gait normal.   Psychiatric:         Mood and Affect: Mood normal.         Thought Content:  Thought content normal.             DIAGNOSTIC DATA         LABORATORY DATA     Results for orders placed or performed in visit on 11/28/18   CBC W/O DIFF   Result Value Ref Range    WBC 5.0 3.4 - 10.8 x10E3/uL    RBC 4.28 3.77 - 5.28 x10E6/uL    HGB 13.6 11.1 - 15.9 g/dL    HCT 40.6 34.0 - 46.6 %    MCV 95 79 - 97 fL    MCH 31.8 26.6 - 33.0 pg    MCHC 33.5 31.5 - 35.7 g/dL    RDW 12.8 12.3 - 15.4 %    PLATELET 437 203 - 490 x10E3/uL   LIPID PANEL   Result Value Ref Range    Cholesterol, total 226 (H) 100 - 199 mg/dL    Triglyceride 59 0 - 149 mg/dL    HDL Cholesterol 88 >39 mg/dL    VLDL, calculated 12 5 - 40 mg/dL    LDL, calculated 126 (H) 0 - 99 mg/dL   METABOLIC PANEL, COMPREHENSIVE   Result Value Ref Range    Glucose 81 65 - 99 mg/dL    BUN 15 8 - 27 mg/dL    Creatinine 0.94 0.57 - 1.00 mg/dL    GFR est non-AA 61 >59 mL/min/1.73    GFR est AA 71 >59 mL/min/1.73    BUN/Creatinine ratio 16 12 - 28    Sodium 143 134 - 144 mmol/L    Potassium 4.2 3.5 - 5.2 mmol/L    Chloride 102 96 - 106 mmol/L    CO2 31 (H) 20 - 29 mmol/L    Calcium 9.8 8.7 - 10.3 mg/dL    Protein, total 7.0 6.0 - 8.5 g/dL    Albumin 4.4 3.5 - 4.8 g/dL    GLOBULIN, TOTAL 2.6 1.5 - 4.5 g/dL    A-G Ratio 1.7 1.2 - 2.2    Bilirubin, total 0.6 0.0 - 1.2 mg/dL    Alk. phosphatase 56 39 - 117 IU/L    AST (SGOT) 24 0 - 40 IU/L    ALT (SGPT) 16 0 - 32 IU/L   TSH 3RD GENERATION   Result Value Ref Range    TSH 3.080 0.450 - 4.500 uIU/mL   T4, FREE   Result Value Ref Range    T4, Free 1.13 0.82 - 1.77 ng/dL   URINALYSIS W/ RFLX MICROSCOPIC   Result Value Ref Range    Specific Gravity 1.015 1.005 - 1.030    pH (UA) 6.0 5.0 - 7.5    Color Yellow Yellow    Appearance Clear Clear    Leukocyte Esterase Negative Negative    Protein Negative Negative/Trace    Glucose Negative Negative    Ketone Negative Negative    Blood Negative Negative    Bilirubin Negative Negative    Urobilinogen 0.2 0.2 - 1.0 mg/dL    Nitrites Negative Negative    Microscopic Examination Comment    CVD REPORT   Result Value Ref Range    INTERPRETATION Note         ASSESSMENT & PLAN       ICD-10-CM ICD-9-CM    1. Medicare annual wellness visit, subsequent Z00.00 V70.0 See separate note under this same encounter visit for Medicare Wellness note. 2. Mild Hypercholesterolemia with Excellent HDL E78.00 272.0 LIPID PANEL      METABOLIC PANEL, COMPREHENSIVE      TSH 3RD GENERATION   3. Hypothyroidism, adult E03.9 244.9 CBC W/O DIFF      LIPID PANEL      METABOLIC PANEL, COMPREHENSIVE      TSH 3RD GENERATION     Fasting labs today  Cardiovascular risk and specific lipid/LDL goals reviewed  Reviewed diet, nutrition, exercise, weight management, BMI/goals, age/risk based screening recommendations, health maintenance & prevention counseling. Cancer screening USPTFS guidelines reviewed w/ pt today. Discussed benefits/positive/negative outcomes of screening based on age/risk stratification. Informed consent for/against screening based on pt's personal hx/risk factors. Updated in history above and health maintenance. Sees gyn for well woman visits/gyn care. Mammogram screen negative 2-2019  Colonoscopy 5 yr screening up to date 2-2018  Immunizations up to date  Further follow up & other recommendations pending review of labs.  If all remains good and stable, follow up in 1 yr, sooner prn

## 2019-12-04 NOTE — PROGRESS NOTES
Chief Complaint   [facility] with   [facility]     . Fasting        1. Have you been to the ER, urgent care clinic since your last visit? Hospitalized since your last visit? No    2. Have you seen or consulted any other health care providers outside of the [address] [place] [address] since your last visit? Include any pap smears or colon screening.    No

## 2019-12-05 LAB
ALBUMIN SERPL-MCNC: 4.5 G/DL (ref 3.5–4.8)
ALBUMIN/GLOB SERPL: 2 {RATIO} (ref 1.2–2.2)
ALP SERPL-CCNC: 56 IU/L (ref 39–117)
ALT SERPL-CCNC: 19 IU/L (ref 0–32)
AST SERPL-CCNC: 25 IU/L (ref 0–40)
BILIRUB SERPL-MCNC: 0.6 MG/DL (ref 0–1.2)
BUN SERPL-MCNC: 19 MG/DL (ref 8–27)
BUN/CREAT SERPL: 20 (ref 12–28)
CALCIUM SERPL-MCNC: 9.8 MG/DL (ref 8.7–10.3)
CHLORIDE SERPL-SCNC: 102 MMOL/L (ref 96–106)
CHOLEST SERPL-MCNC: 244 MG/DL (ref 100–199)
CO2 SERPL-SCNC: 24 MMOL/L (ref 20–29)
CREAT SERPL-MCNC: 0.97 MG/DL (ref 0.57–1)
ERYTHROCYTE [DISTWIDTH] IN BLOOD BY AUTOMATED COUNT: 11.9 % (ref 12.3–15.4)
GLOBULIN SER CALC-MCNC: 2.3 G/DL (ref 1.5–4.5)
GLUCOSE SERPL-MCNC: 92 MG/DL (ref 65–99)
HCT VFR BLD AUTO: 41.9 % (ref 34–46.6)
HDLC SERPL-MCNC: 92 MG/DL
HGB BLD-MCNC: 13.8 G/DL (ref 11.1–15.9)
INTERPRETATION, 910389: NORMAL
INTERPRETATION: NORMAL
LDLC SERPL CALC-MCNC: 139 MG/DL (ref 0–99)
MCH RBC QN AUTO: 30.2 PG (ref 26.6–33)
MCHC RBC AUTO-ENTMCNC: 32.9 G/DL (ref 31.5–35.7)
MCV RBC AUTO: 92 FL (ref 79–97)
PDF IMAGE, 910387: NORMAL
PLATELET # BLD AUTO: 213 X10E3/UL (ref 150–450)
POTASSIUM SERPL-SCNC: 4.6 MMOL/L (ref 3.5–5.2)
PROT SERPL-MCNC: 6.8 G/DL (ref 6–8.5)
RBC # BLD AUTO: 4.57 X10E6/UL (ref 3.77–5.28)
SODIUM SERPL-SCNC: 142 MMOL/L (ref 134–144)
TRIGL SERPL-MCNC: 65 MG/DL (ref 0–149)
TSH SERPL DL<=0.005 MIU/L-ACNC: 2.32 UIU/ML (ref 0.45–4.5)
VLDLC SERPL CALC-MCNC: 13 MG/DL (ref 5–40)
WBC # BLD AUTO: 4.3 X10E3/UL (ref 3.4–10.8)

## 2019-12-21 ENCOUNTER — PATIENT MESSAGE (OUTPATIENT)
Dept: FAMILY MEDICINE CLINIC | Age: 74
End: 2019-12-21

## 2020-01-09 NOTE — TELEPHONE ENCOUNTER
----- Message from Dixon Breaux sent at 1/9/2020  2:08 PM EST -----  Regarding: Dr. Funk Search (if not patient): self    Relationship of caller (if not patient): N/A    Best contact number(s): [phone]    Name of medication and dosage if known: Levothyroxine 25 micro grams    Is patient out of this medication (yes/no): no     Pharmacy name: [facility] [id number] listed in chart? (yes/no): No    [facility] fax number: [phone]    Pharmacy phone number: N/A    Date of last visit: Wednesday, December 04, 2019 09:00 AM    Details to clarify the request: Pt is requesting to have her Rx sent to new pharmacy for medication listed above. Pt also provided member id number [id number] if needed.       .  Requested Prescriptions     Pending Prescriptions Disp Refills    levothyroxine (SYNTHROID) 25 mcg tablet 90 Tab 3       Unable to locate pharmacy

## 2020-01-11 RX ORDER — LEVOTHYROXINE SODIUM 25 UG/1
TABLET ORAL
Qty: 90 TAB | Refills: 3 | Status: SHIPPED | OUTPATIENT
Start: 2020-01-11 | End: 2020-01-14 | Stop reason: SDUPTHER

## 2020-01-14 ENCOUNTER — TELEPHONE (OUTPATIENT)
Dept: FAMILY MEDICINE CLINIC | Age: 75
End: 2020-01-14

## 2020-01-14 NOTE — TELEPHONE ENCOUNTER
Fax from a [address] [facility] [address] stating that rx was sent to a part of the [facility] that isn't a mail order pharmacy. Will need to send the synthroid to the correct [facility]    I called pt to get the address of the pharmacy that it needs to go to. Pt will find ou and send me a [employer] message. Message from pt: I had a call from your office tonight asking for contact information with my [employer] insurance so that you are able to send in an Rx for my levothyroxine.  I called them and [email or web address] said they will send to you an e-script request for this that you can respond to.   I believe you have my insurance  info, but just to confirm, my RxPCN is [employer]. [facility] is #[id number]. [facility] member ID is [id number]. Lashell Levy, they said to make sure my prescription is written for Lilliam Quintana\" (with middle initial).  Thank you! I got a fax from [address] will send rx there.   rx sent per verbal order from Dr Chloe La

## 2020-01-15 RX ORDER — LEVOTHYROXINE SODIUM 25 UG/1
TABLET ORAL
Qty: 90 TAB | Refills: 3 | Status: SHIPPED | OUTPATIENT
Start: 2020-01-15 | End: 2020-12-07 | Stop reason: SDUPTHER

## 2020-02-10 ENCOUNTER — HOSPITAL ENCOUNTER (OUTPATIENT)
Dept: MAMMOGRAPHY | Age: 75
Discharge: HOME | End: 2020-02-10
Attending: FAMILY MEDICINE
Payer: MEDICARE

## 2020-02-10 DIAGNOSIS — Z12.31 VISIT FOR SCREENING MAMMOGRAM: ICD-10-CM

## 2020-02-10 PROCEDURE — 77067 SCR MAMMO BI INCL CAD: CPT

## 2020-12-07 ENCOUNTER — OFFICE VISIT (OUTPATIENT)
Dept: FAMILY MEDICINE CLINIC | Age: 75
End: 2020-12-07
Payer: MEDICARE

## 2020-12-07 VITALS
TEMPERATURE: 97.8 F | RESPIRATION RATE: 16 BRPM | SYSTOLIC BLOOD PRESSURE: 100 MMHG | BODY MASS INDEX: 20.18 KG/M2 | WEIGHT: 118.2 LBS | DIASTOLIC BLOOD PRESSURE: 72 MMHG | OXYGEN SATURATION: 99 % | HEIGHT: 64 IN | HEART RATE: 50 BPM

## 2020-12-07 DIAGNOSIS — G89.29 CHRONIC LEFT HIP PAIN: ICD-10-CM

## 2020-12-07 DIAGNOSIS — Z00.00 MEDICARE ANNUAL WELLNESS VISIT, SUBSEQUENT: Primary | ICD-10-CM

## 2020-12-07 DIAGNOSIS — Z11.59 NEED FOR HEPATITIS C SCREENING TEST: ICD-10-CM

## 2020-12-07 DIAGNOSIS — E78.00 HYPERCHOLESTEROLEMIA: ICD-10-CM

## 2020-12-07 DIAGNOSIS — E03.9 HYPOTHYROIDISM, ADULT: ICD-10-CM

## 2020-12-07 DIAGNOSIS — Z86.2 HISTORY OF IRON DEFICIENCY ANEMIA: ICD-10-CM

## 2020-12-07 DIAGNOSIS — M25.552 CHRONIC LEFT HIP PAIN: ICD-10-CM

## 2020-12-07 LAB
ALBUMIN SERPL-MCNC: 3.9 G/DL (ref 3.5–5)
ALBUMIN/GLOB SERPL: 1.3 {RATIO} (ref 1.1–2.2)
ALP SERPL-CCNC: 71 U/L (ref 45–117)
ALT SERPL-CCNC: 20 U/L (ref 12–78)
ANION GAP SERPL CALC-SCNC: 2 MMOL/L (ref 5–15)
AST SERPL-CCNC: 22 U/L (ref 15–37)
BILIRUB SERPL-MCNC: 0.6 MG/DL (ref 0.2–1)
BUN SERPL-MCNC: 16 MG/DL (ref 6–20)
BUN/CREAT SERPL: 17 (ref 12–20)
CALCIUM SERPL-MCNC: 9.4 MG/DL (ref 8.5–10.1)
CHLORIDE SERPL-SCNC: 108 MMOL/L (ref 97–108)
CHOLEST SERPL-MCNC: 242 MG/DL
CO2 SERPL-SCNC: 32 MMOL/L (ref 21–32)
CREAT SERPL-MCNC: 0.92 MG/DL (ref 0.55–1.02)
ERYTHROCYTE [DISTWIDTH] IN BLOOD BY AUTOMATED COUNT: 12.2 % (ref 11.5–14.5)
GLOBULIN SER CALC-MCNC: 3 G/DL (ref 2–4)
GLUCOSE SERPL-MCNC: 82 MG/DL (ref 65–100)
HCT VFR BLD AUTO: 41.2 % (ref 35–47)
HCV AB SERPL QL IA: NONREACTIVE
HCV COMMENT,HCGAC: NORMAL
HDLC SERPL-MCNC: 98 MG/DL
HDLC SERPL: 2.5 {RATIO} (ref 0–5)
HGB BLD-MCNC: 13.1 G/DL (ref 11.5–16)
LDLC SERPL CALC-MCNC: 133.6 MG/DL (ref 0–100)
LIPID PROFILE,FLP: ABNORMAL
MCH RBC QN AUTO: 30.7 PG (ref 26–34)
MCHC RBC AUTO-ENTMCNC: 31.8 G/DL (ref 30–36.5)
MCV RBC AUTO: 96.5 FL (ref 80–99)
NRBC # BLD: 0 K/UL (ref 0–0.01)
NRBC BLD-RTO: 0 PER 100 WBC
PLATELET # BLD AUTO: 199 K/UL (ref 150–400)
PMV BLD AUTO: 10.4 FL (ref 8.9–12.9)
POTASSIUM SERPL-SCNC: 4.4 MMOL/L (ref 3.5–5.1)
PROT SERPL-MCNC: 6.9 G/DL (ref 6.4–8.2)
RBC # BLD AUTO: 4.27 M/UL (ref 3.8–5.2)
SODIUM SERPL-SCNC: 142 MMOL/L (ref 136–145)
TRIGL SERPL-MCNC: 52 MG/DL (ref ?–150)
TSH SERPL DL<=0.05 MIU/L-ACNC: 2.77 UIU/ML (ref 0.36–3.74)
VLDLC SERPL CALC-MCNC: 10.4 MG/DL
WBC # BLD AUTO: 3.6 K/UL (ref 3.6–11)

## 2020-12-07 PROCEDURE — G8510 SCR DEP NEG, NO PLAN REQD: HCPCS | Performed by: FAMILY MEDICINE

## 2020-12-07 PROCEDURE — 99214 OFFICE O/P EST MOD 30 MIN: CPT | Performed by: FAMILY MEDICINE

## 2020-12-07 PROCEDURE — G0439 PPPS, SUBSEQ VISIT: HCPCS | Performed by: FAMILY MEDICINE

## 2020-12-07 PROCEDURE — G9899 SCRN MAM PERF RSLTS DOC: HCPCS | Performed by: FAMILY MEDICINE

## 2020-12-07 PROCEDURE — 1101F PT FALLS ASSESS-DOCD LE1/YR: CPT | Performed by: FAMILY MEDICINE

## 2020-12-07 PROCEDURE — G8420 CALC BMI NORM PARAMETERS: HCPCS | Performed by: FAMILY MEDICINE

## 2020-12-07 PROCEDURE — G8536 NO DOC ELDER MAL SCRN: HCPCS | Performed by: FAMILY MEDICINE

## 2020-12-07 PROCEDURE — G0463 HOSPITAL OUTPT CLINIC VISIT: HCPCS | Performed by: FAMILY MEDICINE

## 2020-12-07 PROCEDURE — 3017F COLORECTAL CA SCREEN DOC REV: CPT | Performed by: FAMILY MEDICINE

## 2020-12-07 PROCEDURE — 1090F PRES/ABSN URINE INCON ASSESS: CPT | Performed by: FAMILY MEDICINE

## 2020-12-07 PROCEDURE — G8427 DOCREV CUR MEDS BY ELIG CLIN: HCPCS | Performed by: FAMILY MEDICINE

## 2020-12-07 PROCEDURE — G8399 PT W/DXA RESULTS DOCUMENT: HCPCS | Performed by: FAMILY MEDICINE

## 2020-12-07 RX ORDER — LEVOTHYROXINE SODIUM 25 UG/1
TABLET ORAL
Qty: 90 TAB | Refills: 3 | Status: SHIPPED | OUTPATIENT
Start: 2020-12-07 | End: 2020-12-07 | Stop reason: SDUPTHER

## 2020-12-07 RX ORDER — LEVOTHYROXINE SODIUM 25 UG/1
TABLET ORAL
Qty: 90 TAB | Refills: 3 | Status: SHIPPED | OUTPATIENT
Start: 2020-12-07 | End: 2022-01-11

## 2020-12-07 NOTE — PATIENT INSTRUCTIONS
Medicare Wellness Visit, Female     The best way to live healthy is to have a lifestyle where you eat a well-balanced diet, exercise regularly, limit alcohol use, and quit all forms of tobacco/nicotine, if applicable. Regular preventive services are another way to keep healthy. Preventive services (vaccines, screening tests, monitoring & exams) can help personalize your care plan, which helps you manage your own care. Screening tests can find health problems at the earliest stages, when they are easiest to treat. [facility] follows the current, evidence-based guidelines published by the [facility] ([facility]) when recommending preventive services for our patients. Because we follow these guidelines, sometimes recommendations change over time as research supports it. (For example, mammograms used to be recommended annually. Even though Medicare will still pay for an annual mammogram, the newer guidelines recommend a mammogram every two years for women of average risk). Of course, you and your doctor may decide to screen more often for some diseases, based on your risk and your co-morbidities (chronic disease you are already diagnosed with). Preventive services for you include:  - Medicare offers their members a free annual wellness visit, which is time for you and your primary care provider to discuss and plan for your preventive service needs. Take advantage of this benefit every year!  -All adults over the age of 72 should receive the recommended pneumonia vaccines. Current USPSTF guidelines recommend a series of two vaccines for the best pneumonia protection.   -All adults should have a flu vaccine yearly and a tetanus vaccine every 10 years.   -All adults age 48 and older should receive the shingles vaccines (series of two vaccines).       -All adults age 38-68 who are overweight should have a diabetes screening test once every three years.   -All adults born between [birth year] and 1965 should be screened once for Hepatitis C.  -Other screening tests and preventive services for persons with diabetes include: an eye exam to screen for diabetic retinopathy, a kidney function test, a foot exam, and stricter control over your cholesterol.   -Cardiovascular screening for adults with routine risk involves an electrocardiogram (ECG) at intervals determined by your doctor.   -Colorectal cancer screenings should be done for adults age 54-65 with no increased risk factors for colorectal cancer. There are a number of acceptable methods of screening for this type of cancer. Each test has its own benefits and drawbacks. Discuss with your doctor what is most appropriate for you during your annual wellness visit. The different tests include: colonoscopy (considered the best screening method), a fecal occult blood test, a fecal DNA test, and sigmoidoscopy.    -A bone mass density test is recommended when a woman turns 65 to screen for osteoporosis. This test is only recommended one time, as a screening. Some providers will use this same test as a disease monitoring tool if you already have osteoporosis. -Breast cancer screenings are recommended every other year for women of normal risk, age 54-69.  -Cervical cancer screenings for women over age 72 are only recommended with certain risk factors. Here is a list of your current Health Maintenance items (your personalized list of preventive services) with a due date: There are no preventive care reminders to display for this patient.

## 2020-12-07 NOTE — PROGRESS NOTES
This is the Subsequent Medicare Annual Wellness Exam, performed 12 months or more after the Initial AWV or the last Subsequent AWV    I have reviewed the patient's medical history in detail and updated the computerized patient record. Depression Risk Factor Screening:     3 most recent PHQ Screens 12/7/2020   Little interest or pleasure in doing things Not at all   Feeling down, depressed, irritable, or hopeless Not at all   Total Score PHQ 2 0       Alcohol Risk Screen   Do you average more than 1 drink per night or more than 7 drinks a week:  No    On any one occasion in the past three months have you have had more than 3 drinks containing alcohol:  No        Functional Ability and Level of Safety:   Hearing: Hearing is good. Activities of Daily Living: The home contains: no safety equipment. Patient does total self care  ADL Assessment 12/7/2020   Feeding yourself No Help Needed   Getting from bed to chair No Help Needed   Getting dressed No Help Needed   Bathing or showering No Help Needed   Walk across the room (includes cane/walker) No Help Needed   Using the telphone No Help Needed   Taking your medications No Help Needed   Preparing meals No Help Needed   Managing money (expenses/bills) No Help Needed   Moderately strenuous housework (laundry) No Help Needed   Shopping for personal items (toiletries/medicines) No Help Needed   Shopping for groceries No Help Needed   Driving No Help Needed   Climbing a flight of stairs No Help Needed   Getting to places beyond walking distances No Help Needed        Ambulation: with no difficulty     Fall Risk:  Fall Risk Assessment, last 12 mths 12/7/2020   Able to walk? Yes   Fall in past 12 months?  No     Abuse Screen:  Patient is not abused       Cognitive Screening   Has your family/caregiver stated any concerns about your memory: no         Assessment/Plan   Education and counseling provided:  Are appropriate based on today's review and evaluation    Diagnoses and all orders for this visit:    1. Medicare annual wellness visit, subsequent    2. Need for hepatitis C screening test  -     HEPATITIS C AB; Future        Health Maintenance Due   There are no preventive care reminders to display for this patient.     Patient Care Team   Patient Care Team:  Juvencio Warren MD as PCP - General  Juvencio Warren MD as PCP - [facility] Empaneled Provider    History     Patient Active Problem List   Diagnosis Code    Family history of colon cancer Z80.0    Family history of breast cancer Z80.3    History of mild iron deficiency anemia Z86.2    Hypothyroidism E03.9    Postmenopausal, LMP 2006, No HRT Z78.0    Benign SQ Mass on right back R22.2    Right Inguinal hernia, mild K40.90    S/P colonoscopy Z98.890    Mild Hypercholesterolemia with Excellent HDL E78.00    Screening exams for skin cancer Z12.83    S/P Fever of unknown origin (FUO), Suspected Viral Illness R50.9    Hypothyroidism, adult E03.9    ACP (advance care planning) Z71.89     Past Medical History:   Diagnosis Date    ACP (advance care planning) 2017    Patient has an AMD. ACP note     Benign SQ Mass on right back 2010    Family history of breast cancer 2010    Family history of colon cancer 2010    History of mild iron deficiency anemia 2010    Hypothyroidism 2010    Iron deficiency anemia 2010    Mass on back 2010    Mild Hypercholesterolemia with Excellent HDL 2013    Postmenopausal 2010    Postmenopausal, LMP 2006, No HRT 2010    Right Inguinal hernia, mild 2010    S/P colonoscopy 2010    S/P Fever of unknown origin (FUO), Suspected Viral Illness 2014-2014: ID Dr Trini Birmingham (spontaneous )     Screening exams for skin cancer 2013    Derm: Previously Dr Damien Miramontes, then Dr Chad Guo      Past Surgical History:   Procedure Laterality Date    BIOPSY BREAST  1981    negative, left breast; Dr Ankush Fairchild    benign polyp; Dr Wang Plpalu  2007    normal; ok x 5yrs; Dr Maddy Borrero; f/u 5 yrs    DEXA BONE DENSITY STUDY AXIAL  10/2001    normal    DEXA BONE DENSITY STUDY AXIAL  10/2007    normal    EKG ORDERABLE  2008    Sinus bradycardia    EKG ORDERABLE  2009    Sinus bradycardia    HM DEXA SCAN  10/2009, 10/2011    VPI (scanned reports)    HX BREAST BIOPSY Left 1982    years ago; neg    HX COLONOSCOPY  2013    Internal Hemorrhoids; repeat 5 yrs due to fam hx; Dr Imtiaz Pringle HX COLONOSCOPY  2018    Internal Hemorrhoids, Polyp; repeat 5 yrs due to fam hx; Dr Analisa Haines    varicose veins, Dr Elena Rodríguez Left 2016    for varicose veins    XR HIP RT W OR WO PELV 2-3 VWS  2008    normal     Current Outpatient Medications   Medication Sig Dispense Refill    levothyroxine (SYNTHROID) 25 mcg tablet TAKE 1 TABLET EVERY DAY  BEFORE  BREAKFAST 90 Tab 3    CHOLECALCIFEROL, VITAMIN D3, (VITAMIN D3 PO) Take 2,000 Units by mouth Three (3) times a week.        Allergies   Allergen Reactions    Pcn [Penicillins] Other (comments)     As child-can't remember       Family History   Problem Relation Age of Onset    Cancer Mother         lung cancer    Hypertension Mother    [facility] Arthritis-osteo Mother         obese    Diabetes Father     Cancer Father         bladder cancer and BCC skin cancer    Colon Cancer Sister          age 52    Gearld German Sister    [facility] Breast Cancer Sister         diagnosed age ~ 62, survivor    Depression Son     Anxiety Son     Breast Cancer Other         great aunt     Social History     Tobacco Use    Smoking status: Never Smoker    Smokeless tobacco: Never Used   Substance Use Topics    Alcohol use: Yes     Comment: 1 glass wine 2-3 times/week

## 2020-12-07 NOTE — PROGRESS NOTES
Chief Complaint   Patient presents with    Annual Wellness Visit    Hip Pain     Left side for 6 weeks        1. Have you been to the ER, urgent care clinic since your last visit? Hospitalized since your last visit? No    2. Have you seen or consulted any other health care providers outside of the [address] [place] [address] since your last visit? Include any pap smears or colon screening. No    3 most recent PHQ Screens 12/7/2020   Little interest or pleasure in doing things Not at all   Feeling down, depressed, irritable, or hopeless Not at all   Total Score PHQ 2 0       Fall Risk Assessment, last 12 mths 12/7/2020   Able to walk? Yes   Fall in past 12 months? No               ADL Assessment 12/7/2020   Feeding yourself No Help Needed   Getting from bed to chair No Help Needed   Getting dressed No Help Needed   Bathing or showering No Help Needed   Walk across the room (includes cane/walker) No Help Needed   Using the telphone No Help Needed   Taking your medications No Help Needed   Preparing meals No Help Needed   Managing money (expenses/bills) No Help Needed   Moderately strenuous housework (laundry) No Help Needed   Shopping for personal items (toiletries/medicines) No Help Needed   Shopping for groceries No Help Needed   Driving No Help Needed   Climbing a flight of stairs No Help Needed   Getting to places beyond walking distances No Help Needed         Abuse Screening Questionnaire 12/7/2020   Do you ever feel afraid of your partner? N   Are you in a relationship with someone who physically or mentally threatens you? N   Is it safe for you to go home?  Roseline Mosley

## 2020-12-07 NOTE — PROGRESS NOTES
Chief Complaint   Patient presents with    Annual Wellness Visit    Cholesterol Problem     Fasting     Thyroid Problem    Medication Refill     Synthroid    Hip Pain     Left x 6 weeks       HISTORY OF PRESENT ILLNESS   HPI  Fasting follow up hypercholesterolemia, hypothyroidism, labs and medication check. Continues sensible, balanced diet. Exercises regularly and stays very active. Enjoys traveling and hiking w/ her . The past 6 weeks her left hip has been bothering her. Outer part of left hip. Only bothers her when bearing wt or getting up to walk around after having been sitting for a long time. Occasionally feels like it is a sharp, catching or gripping pain that grabs her when she either first puts wt on it or after walking around. However it doesn't bother her or stop her from playing tennis. It does bother her when she goes hiking/climbing hills. Rated 3-4/10 and not enough that she feels she needs to take any pain relievers for it. Does not bother her at night and she is sleeping fine. Denies stiffness, fevers, chills, sweats or wt decline. REVIEW OF SYMPTOMS   Review of Systems   Constitutional: Negative. Negative for chills, fever, malaise/fatigue and weight loss. HENT: Negative. Eyes: Negative. Respiratory: Negative. Cardiovascular: Negative. Gastrointestinal: Negative. Genitourinary: Negative. Neurological: Negative. Endo/Heme/Allergies: Negative. Psychiatric/Behavioral: Negative.             PROBLEM LIST/MEDICAL HISTORY     Problem List  Date Reviewed: 12/7/2020          Codes Class Noted    ACP (advance care planning) ICD-10-CM: Z71.89  ICD-9-CM: V65.49  11/27/2017    Overview Signed 11/27/2017  9:51 AM by Marika Fuentes MD     Patient has an AMD. ACP note              Hypothyroidism, adult ICD-10-CM: E03.9  ICD-9-CM: 244.9  11/20/2015    Overview Signed 11/20/2015 10:01 AM by Marika Fuentes MD     Dx 2008; borderline tests 2003             S/P Fever of unknown origin (FUO), Suspected Viral Illness ICD-10-CM: R50.9  ICD-9-CM: 780.60  9/29/2014    Overview Signed 9/29/2014 12:51 PM by Nasima Nuñez MD     7/2014-8/2014: ID Dr Marylee Sharps             Mild Hypercholesterolemia with Excellent HDL ICD-10-CM: E78.00  ICD-9-CM: 272.0  11/14/2013        Screening exams for skin cancer ICD-10-CM: Z12.83  ICD-9-CM: V76.43  11/14/2013    Overview Signed 11/14/2013  3:11 PM by Nasima Nuñez MD     Derm: Previously Dr Lynn Dior, then Dr aMn Baxter             Right Inguinal hernia, mild ICD-10-CM: K40.90  ICD-9-CM: 550.90  9/29/2010    Overview Signed 9/29/2010 10:00 AM by Nasima Nuñez MD     [id number]             S/P colonoscopy ICD-10-CM: A63.077  ICD-9-CM: V45.89  9/29/2010    Overview Signed 9/29/2010 10:01 AM by Nasima Nuñez MD     2002 benign polyp; 2007, normal; f/u 5 yrs; Dr Panchito Henderson             Family history of colon cancer ICD-10-CM: Z80.0  ICD-9-CM: V16.0  4/22/2010    Overview Signed 4/22/2010  9:50 AM by Nette Mcnulty     sister             Family history of breast cancer ICD-10-CM: Z80.3  ICD-9-CM: V16.3  4/22/2010    Overview Signed 4/22/2010  9:50 AM by Nette Mcnulty     sister             History of mild iron deficiency anemia ICD-10-CM: Z86.2  ICD-9-CM: V12.3  4/22/2010        Hypothyroidism ICD-10-CM: E03.9  ICD-9-CM: 244.9  4/22/2010    Overview Addendum 9/29/2010  9:59 AM by Nasima Nuñez MD     Dx 2008; borderline tests 2003             Postmenopausal, LMP 2006, No HRT ICD-10-CM: Z78.0  ICD-9-CM: V49.81  4/22/2010    Overview Signed 4/22/2010  9:52 AM by Nette Mcnulty     LMP 10/2006 no HRT             Benign SQ Mass on right back ICD-10-CM: R22.2  ICD-9-CM: 782.2  4/22/2010    Overview Signed 4/22/2010  9:53 AM by Nette Mcnulty     1980's R low back mass Lipoma vs Cyst                       PAST SURGICAL HISTORY     Past Surgical History:   Procedure Laterality Date    BIOPSY BREAST  1981    negative, left breast; Dr Storm Dejesus    benign polyp; Dr Dung Bonilla  11/2007    normal; ok x 5yrs; Dr Cm Roque; f/u 5 yrs    DEXA BONE DENSITY STUDY AXIAL  10/2001    normal    DEXA BONE DENSITY STUDY AXIAL  10/2007    normal    EKG ORDERABLE  9/2008    Sinus bradycardia    EKG ORDERABLE  9/2009    Sinus bradycardia    HM DEXA SCAN  10/2009, 10/2011    VPI (scanned reports)    HX BREAST BIOPSY Left 1982    years ago; neg    HX COLONOSCOPY  01/17/2013    Internal Hemorrhoids; repeat 5 yrs due to fam hx; Dr Loc Machuca HX COLONOSCOPY  02/2018    Internal Hemorrhoids, Polyp; repeat 5 yrs due to fam hx; Dr Graciela Bella    varicose veins, Dr Estes December Left 06/2016    for varicose veins    XR HIP RT W OR WO PELV 2-3 VWS  9/2008    normal         MEDICATIONS     Current Outpatient Medications   Medication Sig    levothyroxine (SYNTHROID) 25 mcg tablet TAKE 1 TABLET EVERY DAY  BEFORE  BREAKFAST    CHOLECALCIFEROL, VITAMIN D3, (VITAMIN D3 PO) Take 2,000 Units by mouth Three (3) times a week. No current facility-administered medications for this visit.            ALLERGIES     Allergies   Allergen Reactions    Pcn [Penicillins] Other (comments)     As child-can't remember          SOCIAL HISTORY     Social History     Socioeconomic History    Marital status:      Spouse name: Not on file    Number of children: 3    Years of education: Not on file    Highest education level: Not on file   Occupational History    Occupation: Homemaker   Tobacco Use    Smoking status: Never Smoker    Smokeless tobacco: Never Used   Substance and Sexual Activity    Alcohol use: Yes     Comment: 1 glass wine 2-3 times/week    Drug use: No    Sexual activity: Yes     Partners: Male   Other Topics Concern     Service No    Blood Transfusions No    Caffeine Concern No Comment: none, except occ chocolate    Occupational Exposure No    Hobby Hazards No    Sleep Concern No    Stress Concern No    Weight Concern No     Comment: pretty stable, usually runs ~ 120 range    Special Diet No     Comment: sensible diet, well balanced    Back Care No    Exercise Yes     Comment: ryan berg w/ , walks regularly ~ 45 minutes to an hour almost every day (3 miles); tennis seasonally ~ 2x a week; occ bikes    Bike Helmet Yes    Seat Belt Yes    Self-Exams Yes   Social History Narrative    Pneumovax at age 62 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         IMMUNIZATIONS  Immunization History   Administered Date(s) Administered    Hep A Vaccine 2013    Hep A Vaccine (Adult) 2014    Influenza High Dose Vaccine PF 2016, 11/10/2017, 09/15/2018, 10/19/2019, 2020    Influenza Vaccine 10/15/2013, 10/14/2014, 10/31/2015    Influenza Vaccine Split 10/10/2012    Influenza Vaccine Whole 10/04/2011    Influenza, Quadrivalent, Adjuvanted (>65 Yrs FLUAD QUAD [id number]) 2020    Pneumococcal Conjugate (PCV-13) 2016    Pneumococcal Polysaccharide (PPSV-23) 10/01/2010, 2015    TD Vaccine 2008    Td 2019    Typhoid Vi Capsular Polysaccharide Vaccine 2013    Yellow Fever Vaccine 2013    Zoster Recombinant 2019, 2019    Zoster Vaccine, Live 2003         FAMILY HISTORY     Family History   Problem Relation Age of Onset    Cancer Mother         lung cancer    Hypertension Mother     Arthritis-osteo Mother         obese    Diabetes Father     Cancer Father         bladder cancer and BCC skin cancer    Colon Cancer Sister          age 52    [de-identified] Sister    Edgar Breast Cancer Sister         diagnosed age ~ 62, survivor    Depression Son     Anxiety Son     Breast Cancer Other         great aunt         VITALS     Visit Vitals  /72 (BP 1 Location: Right arm, BP Patient Position: Sitting)   Pulse (!) 50   Temp 97.8 °F (36.6 °C) (Temporal)   Resp 16   Ht 5' 4\" (1.626 m)   Wt 118 lb 3.2 oz (53.6 kg)   SpO2 99%   BMI 20.29 kg/m²          PHYSICAL EXAMINATION   Physical Exam  Vitals signs reviewed. Constitutional:       General: She is not in acute distress. Appearance: Normal appearance. HENT:      Right Ear: Tympanic membrane normal.      Left Ear: Tympanic membrane normal.   Eyes:      General: No scleral icterus. Conjunctiva/sclera: Conjunctivae normal.   Neck:      Musculoskeletal: Neck supple. Thyroid: No thyroid mass, thyromegaly or thyroid tenderness. Vascular: No carotid bruit. Cardiovascular:      Rate and Rhythm: Normal rate and regular rhythm. Heart sounds: Normal heart sounds. No murmur. No gallop. Pulmonary:      Effort: Pulmonary effort is normal.      Breath sounds: Normal breath sounds. Abdominal:      Palpations: Abdomen is soft. Tenderness: There is no abdominal tenderness. Musculoskeletal: Normal range of motion. General: No swelling or tenderness. Left hip: Normal. She exhibits normal range of motion, normal strength and no tenderness. Right lower leg: No edema. Left lower leg: No edema. Lymphadenopathy:      Cervical: No cervical adenopathy. Skin:     General: Skin is warm. Neurological:      General: No focal deficit present. Mental Status: She is alert and oriented to person, place, and time. Mental status is at baseline. Cranial Nerves: Cranial nerves are intact. Motor: Motor function is intact. Coordination: Coordination is intact. Gait: Gait is intact. Deep Tendon Reflexes: Reflexes are normal and symmetric.    Psychiatric:         Mood and Affect: Mood normal.         Behavior: Behavior normal.             DIAGNOSTIC DATA         LABORATORY DATA     Results for orders placed or performed in visit on 12/04/19   CBC W/O DIFF   Result Value Ref Range    WBC 4.3 3.4 - 10.8 x10E3/uL    RBC 4.57 3.77 - 5.28 x10E6/uL    HGB 13.8 11.1 - 15.9 g/dL    HCT 41.9 34.0 - 46.6 %    MCV 92 79 - 97 fL    MCH 30.2 26.6 - 33.0 pg    MCHC 32.9 31.5 - 35.7 g/dL    RDW 11.9 (L) 12.3 - 15.4 %    PLATELET 069 850 - 226 x10E3/uL   LIPID PANEL   Result Value Ref Range    Cholesterol, total 244 (H) 100 - 199 mg/dL    Triglyceride 65 0 - 149 mg/dL    HDL Cholesterol 92 >39 mg/dL    VLDL, calculated 13 5 - 40 mg/dL    LDL, calculated 139 (H) 0 - 99 mg/dL   METABOLIC PANEL, COMPREHENSIVE   Result Value Ref Range    Glucose 92 65 - 99 mg/dL    BUN 19 8 - 27 mg/dL    Creatinine 0.97 0.57 - 1.00 mg/dL    GFR est non-AA 59 (L) >59 mL/min/1.73    GFR est AA 67 >59 mL/min/1.73    BUN/Creatinine ratio 20 12 - 28    Sodium 142 134 - 144 mmol/L    Potassium 4.6 3.5 - 5.2 mmol/L    Chloride 102 96 - 106 mmol/L    CO2 24 20 - 29 mmol/L    Calcium 9.8 8.7 - 10.3 mg/dL    Protein, total 6.8 6.0 - 8.5 g/dL    Albumin 4.5 3.5 - 4.8 g/dL    GLOBULIN, TOTAL 2.3 1.5 - 4.5 g/dL    A-G Ratio 2.0 1.2 - 2.2    Bilirubin, total 0.6 0.0 - 1.2 mg/dL    Alk. phosphatase 56 39 - 117 IU/L    AST (SGOT) 25 0 - 40 IU/L    ALT (SGPT) 19 0 - 32 IU/L   TSH 3RD GENERATION   Result Value Ref Range    TSH 2.320 0.450 - 4.500 uIU/mL   CVD REPORT   Result Value Ref Range    INTERPRETATION Note     PDF IMAGE Not applicable    CKD REPORT   Result Value Ref Range    Interpretation Note             ASSESSMENT & PLAN       ICD-10-CM ICD-9-CM    1. Medicare annual wellness visit, subsequent  Z00.00 V70.0    2. Need for hepatitis C screening test  Z11.59 V73.89 HEPATITIS C AB      HEPATITIS C AB   3. Mild Hypercholesterolemia with Excellent HDL  X08.24 914.3 METABOLIC PANEL, COMPREHENSIVE      LIPID PANEL      LIPID PANEL      METABOLIC PANEL, COMPREHENSIVE   4.  Hypothyroidism, adult  E03.9 244.9 TSH 3RD GENERATION      levothyroxine (SYNTHROID) 25 mcg tablet, printed rx given to patient per her request so she can take to her new pharmacy after labs and once her new insurance plan takes effect 1-1-2021      TSH 3RD GENERATION   5. History of mild iron deficiency anemia  Z86.2 V12.3 CBC W/O DIFF      CBC W/O DIFF   6. Chronic left hip pain  M25.552 719.45 REFERRAL TO ORTHOPEDICS    G89.29 338.29      Fasting labs checked today  Cardiovascular risk and specific lipid/LDL goals reviewed  Reviewed diet, nutrition, exercise, weight management, BMI/goals. Age/risk based screening recommendations, health maintenance & prevention counseling. Cancer screening USPTFS guidelines reviewed w/ pt today. Discussed benefits/positive/negative outcomes of screening based on age/risk stratification. Informed consent for/against screening based on pt's personal hx/risk factors. Updated in history above and health maintenance. Further follow up & other recommendations pending review of labs.  If all remains good and stable, follow up in 1 yr

## 2020-12-27 ENCOUNTER — PATIENT MESSAGE (OUTPATIENT)
Dept: FAMILY MEDICINE CLINIC | Age: 75
End: 2020-12-27

## 2021-01-11 ENCOUNTER — TRANSCRIBE ORDER (OUTPATIENT)
Dept: SCHEDULING | Age: 76
End: 2021-01-11

## 2021-01-11 DIAGNOSIS — Z12.31 VISIT FOR SCREENING MAMMOGRAM: Primary | ICD-10-CM

## 2021-02-11 ENCOUNTER — HOSPITAL ENCOUNTER (OUTPATIENT)
Dept: MAMMOGRAPHY | Age: 76
Discharge: HOME | End: 2021-02-11
Attending: FAMILY MEDICINE
Payer: MEDICARE

## 2021-02-11 ENCOUNTER — TRANSCRIBE ORDER (OUTPATIENT)
Dept: GENERAL RADIOLOGY | Age: 76
End: 2021-02-11

## 2021-02-11 DIAGNOSIS — Z12.31 VISIT FOR SCREENING MAMMOGRAM: ICD-10-CM

## 2021-02-11 DIAGNOSIS — Z12.31 VISIT FOR SCREENING MAMMOGRAM: Primary | ICD-10-CM

## 2021-02-11 PROCEDURE — 77063 BREAST TOMOSYNTHESIS BI: CPT

## 2021-12-08 ENCOUNTER — TELEPHONE (OUTPATIENT)
Dept: FAMILY MEDICINE CLINIC | Age: 76
End: 2021-12-08

## 2021-12-08 ENCOUNTER — OFFICE VISIT (OUTPATIENT)
Dept: FAMILY MEDICINE CLINIC | Age: 76
End: 2021-12-08
Payer: MEDICARE

## 2021-12-08 VITALS
SYSTOLIC BLOOD PRESSURE: 118 MMHG | HEIGHT: 64 IN | HEART RATE: 55 BPM | DIASTOLIC BLOOD PRESSURE: 74 MMHG | OXYGEN SATURATION: 97 % | TEMPERATURE: 98.1 F | RESPIRATION RATE: 16 BRPM | WEIGHT: 118.2 LBS | BODY MASS INDEX: 20.18 KG/M2

## 2021-12-08 DIAGNOSIS — E03.9 HYPOTHYROIDISM, ADULT: ICD-10-CM

## 2021-12-08 DIAGNOSIS — M16.12 PRIMARY OSTEOARTHRITIS OF LEFT HIP: ICD-10-CM

## 2021-12-08 DIAGNOSIS — E78.00 HYPERCHOLESTEROLEMIA: ICD-10-CM

## 2021-12-08 DIAGNOSIS — Z00.00 MEDICARE ANNUAL WELLNESS VISIT, SUBSEQUENT: Primary | ICD-10-CM

## 2021-12-08 PROBLEM — M17.11 PRIMARY OSTEOARTHRITIS OF RIGHT KNEE: Status: ACTIVE | Noted: 2021-12-08

## 2021-12-08 LAB
ALBUMIN SERPL-MCNC: 3.7 G/DL (ref 3.5–5)
ALBUMIN/GLOB SERPL: 1 {RATIO} (ref 1.1–2.2)
ALP SERPL-CCNC: 64 U/L (ref 45–117)
ALT SERPL-CCNC: 26 U/L (ref 12–78)
ANION GAP SERPL CALC-SCNC: 3 MMOL/L (ref 5–15)
AST SERPL-CCNC: 28 U/L (ref 15–37)
BILIRUB SERPL-MCNC: 0.5 MG/DL (ref 0.2–1)
BUN SERPL-MCNC: 17 MG/DL (ref 6–20)
BUN/CREAT SERPL: 19 (ref 12–20)
CALCIUM SERPL-MCNC: 9.9 MG/DL (ref 8.5–10.1)
CHLORIDE SERPL-SCNC: 106 MMOL/L (ref 97–108)
CHOLEST SERPL-MCNC: 244 MG/DL
CO2 SERPL-SCNC: 29 MMOL/L (ref 21–32)
CREAT SERPL-MCNC: 0.91 MG/DL (ref 0.55–1.02)
ERYTHROCYTE [DISTWIDTH] IN BLOOD BY AUTOMATED COUNT: 11.5 % (ref 11.5–14.5)
GLOBULIN SER CALC-MCNC: 3.6 G/DL (ref 2–4)
GLUCOSE SERPL-MCNC: 95 MG/DL (ref 65–100)
HCT VFR BLD AUTO: 41.2 % (ref 35–47)
HDLC SERPL-MCNC: 86 MG/DL
HDLC SERPL: 2.8 {RATIO} (ref 0–5)
HGB BLD-MCNC: 13.2 G/DL (ref 11.5–16)
LDLC SERPL CALC-MCNC: 142.4 MG/DL (ref 0–100)
MCH RBC QN AUTO: 30.5 PG (ref 26–34)
MCHC RBC AUTO-ENTMCNC: 32 G/DL (ref 30–36.5)
MCV RBC AUTO: 95.2 FL (ref 80–99)
NRBC # BLD: 0 K/UL (ref 0–0.01)
NRBC BLD-RTO: 0 PER 100 WBC
PLATELET # BLD AUTO: 230 K/UL (ref 150–400)
PMV BLD AUTO: 10 FL (ref 8.9–12.9)
POTASSIUM SERPL-SCNC: 4.6 MMOL/L (ref 3.5–5.1)
PROT SERPL-MCNC: 7.3 G/DL (ref 6.4–8.2)
RBC # BLD AUTO: 4.33 M/UL (ref 3.8–5.2)
SODIUM SERPL-SCNC: 138 MMOL/L (ref 136–145)
T4 FREE SERPL-MCNC: 1 NG/DL (ref 0.8–1.5)
TRIGL SERPL-MCNC: 78 MG/DL (ref ?–150)
TSH SERPL DL<=0.05 MIU/L-ACNC: 3.71 UIU/ML (ref 0.36–3.74)
VLDLC SERPL CALC-MCNC: 15.6 MG/DL
WBC # BLD AUTO: 4.2 K/UL (ref 3.6–11)

## 2021-12-08 PROCEDURE — G8427 DOCREV CUR MEDS BY ELIG CLIN: HCPCS | Performed by: FAMILY MEDICINE

## 2021-12-08 PROCEDURE — G8399 PT W/DXA RESULTS DOCUMENT: HCPCS | Performed by: FAMILY MEDICINE

## 2021-12-08 PROCEDURE — G8420 CALC BMI NORM PARAMETERS: HCPCS | Performed by: FAMILY MEDICINE

## 2021-12-08 PROCEDURE — G8536 NO DOC ELDER MAL SCRN: HCPCS | Performed by: FAMILY MEDICINE

## 2021-12-08 PROCEDURE — 1101F PT FALLS ASSESS-DOCD LE1/YR: CPT | Performed by: FAMILY MEDICINE

## 2021-12-08 PROCEDURE — G0463 HOSPITAL OUTPT CLINIC VISIT: HCPCS | Performed by: FAMILY MEDICINE

## 2021-12-08 PROCEDURE — G0439 PPPS, SUBSEQ VISIT: HCPCS | Performed by: FAMILY MEDICINE

## 2021-12-08 PROCEDURE — 99214 OFFICE O/P EST MOD 30 MIN: CPT | Performed by: FAMILY MEDICINE

## 2021-12-08 PROCEDURE — G9899 SCRN MAM PERF RSLTS DOC: HCPCS | Performed by: FAMILY MEDICINE

## 2021-12-08 PROCEDURE — G8510 SCR DEP NEG, NO PLAN REQD: HCPCS | Performed by: FAMILY MEDICINE

## 2021-12-08 PROCEDURE — 1090F PRES/ABSN URINE INCON ASSESS: CPT | Performed by: FAMILY MEDICINE

## 2021-12-08 PROCEDURE — 3017F COLORECTAL CA SCREEN DOC REV: CPT | Performed by: FAMILY MEDICINE

## 2021-12-08 RX ORDER — DICLOFENAC SODIUM 10 MG/G
4 GEL TOPICAL
Qty: 100 G | Refills: 2 | Status: SHIPPED | OUTPATIENT
Start: 2021-12-08

## 2021-12-08 RX ORDER — CALCIUM CARBONATE/VITAMIN D3 600 MG-125
1 TABLET ORAL DAILY
COMMUNITY

## 2021-12-08 NOTE — PATIENT INSTRUCTIONS
Medicare Wellness Visit, Female     The best way to live healthy is to have a lifestyle where you eat a well-balanced diet, exercise regularly, limit alcohol use, and quit all forms of tobacco/nicotine, if applicable. Regular preventive services are another way to keep healthy. Preventive services (vaccines, screening tests, monitoring & exams) can help personalize your care plan, which helps you manage your own care. Screening tests can find health problems at the earliest stages, when they are easiest to treat. [facility] follows the current, evidence-based guidelines published by the [facility] ([facility]) when recommending preventive services for our patients. Because we follow these guidelines, sometimes recommendations change over time as research supports it. (For example, mammograms used to be recommended annually. Even though Medicare will still pay for an annual mammogram, the newer guidelines recommend a mammogram every two years for women of average risk). Of course, you and your doctor may decide to screen more often for some diseases, based on your risk and your co-morbidities (chronic disease you are already diagnosed with). Preventive services for you include:  - Medicare offers their members a free annual wellness visit, which is time for you and your primary care provider to discuss and plan for your preventive service needs. Take advantage of this benefit every year!  -All adults over the age of 72 should receive the recommended pneumonia vaccines. Current USPSTF guidelines recommend a series of two vaccines for the best pneumonia protection.   -All adults should have a flu vaccine yearly and a tetanus vaccine every 10 years.   -All adults age 48 and older should receive the shingles vaccines (series of two vaccines).       -All adults age 38-68 who are overweight should have a diabetes screening test once every three years.   -All adults born between [birth year] and 1963 should be screened once for Hepatitis C.  -Other screening tests and preventive services for persons with diabetes include: an eye exam to screen for diabetic retinopathy, a kidney function test, a foot exam, and stricter control over your cholesterol.   -Cardiovascular screening for adults with routine risk involves an electrocardiogram (ECG) at intervals determined by your doctor.   -Colorectal cancer screenings should be done for adults age 54-65 with no increased risk factors for colorectal cancer. There are a number of acceptable methods of screening for this type of cancer. Each test has its own benefits and drawbacks. Discuss with your doctor what is most appropriate for you during your annual wellness visit. The different tests include: colonoscopy (considered the best screening method), a fecal occult blood test, a fecal DNA test, and sigmoidoscopy.    -A bone mass density test is recommended when a woman turns 65 to screen for osteoporosis. This test is only recommended one time, as a screening. Some providers will use this same test as a disease monitoring tool if you already have osteoporosis. -Breast cancer screenings are recommended every other year for women of normal risk, age 54-69.  -Cervical cancer screenings for women over age 72 are only recommended with certain risk factors. Here is a list of your current Health Maintenance items (your personalized list of preventive services) with a due date: There are no preventive care reminders to display for this patient. Hip Arthritis: Exercises  Introduction  Here are some examples of exercises for you to try. The exercises may be suggested for a condition or for rehabilitation. Start each exercise slowly. Ease off the exercises if you start to have pain. You will be told when to start these exercises and which ones will work best for you.   How to do the exercises  Straight-leg raises to the outside    1. Lie on your side, with your affected hip on top. 2. Tighten the front thigh muscles of your top leg to keep your knee straight. 3. Keep your hip and your leg straight in line with the rest of your body, and keep your knee pointing forward. Do not drop your hip back. 4. Lift your top leg straight up toward the ceiling, about 12 inches off the floor. Hold for about 6 seconds, then slowly lower your leg. 5. Repeat 8 to 12 times. 6. Switch legs and repeat steps 1 through 5, even if only one hip is sore. Straight-leg raises to the inside    1. Lie on your side with your affected hip on the floor. 2. You can either prop up your other leg on a chair, or you can bend that knee and put that foot in front of your other knee. Do not drop your hip back. 3. Tighten the muscles on the front thigh of your bottom leg to straighten that knee. 4. Keep your kneecap pointing forward and your leg straight, and lift your bottom leg up toward the ceiling about 6 inches. Hold for about 6 seconds, then lower slowly. 5. Repeat 8 to 12 times. 6. Switch legs and repeat steps 1 through 5, even if only one hip is sore. Hip hike    1. Stand sideways on the bottom step of a staircase, and hold on to the banister or wall. 2. Keeping both knees straight, lift your good leg off the step and let it hang down. Then hike your good hip up to the same level as your affected hip or a little higher. 3. Repeat 8 to 12 times. 4. Switch legs and repeat steps 1 through 3, even if only one hip is sore. Bridging    1. Lie on your back with both knees bent. Your knees should be bent about 90 degrees. 2. Then push your feet into the floor, squeeze your buttocks, and lift your hips off the floor until your shoulders, hips, and knees are all in a straight line. 3. Hold for about 6 seconds as you continue to breathe normally, and then slowly lower your hips back down to the floor and rest for up to 10 seconds.   4. Repeat 8 to 12 times. Hamstring stretch (lying down)    1. Lie flat on your back with your legs straight. If you feel discomfort in your back, place a small towel roll under your lower back. 2. Holding the back of your affected leg, lift your leg straight up and toward your body until you feel a stretch at the back of your thigh. 3. Hold the stretch for at least 30 seconds. 4. Repeat 2 to 4 times. 5. Switch legs and repeat steps 1 through 4, even if only one hip is sore. Standing quadriceps stretch    1. If you are not steady on your feet, hold on to a chair, counter, or wall. You can also lie on your stomach or your side to do this exercise. 2. Bend the knee of the leg you want to stretch, and reach behind you to grab the front of your foot or ankle with the hand on the same side. For example, if you are stretching your right leg, use your right hand. 3. Keeping your knees next to each other, pull your foot toward your buttock until you feel a gentle stretch across the front of your hip and down the front of your thigh. Your knee should be pointed directly to the ground, and not out to the side. 4. Hold the stretch for at least 15 to 30 seconds. 5. Repeat 2 to 4 times. 6. Switch legs and repeat steps 1 through 5, even if only one hip is sore. Hip rotator stretch    1. Lie on your back with both knees bent and your feet flat on the floor. 2. Put the ankle of your affected leg on your opposite thigh near your knee. 3. Use your hand to gently push your knee away from your body until you feel a gentle stretch around your hip. 4. Hold the stretch for 15 to 30 seconds. 5. Repeat 2 to 4 times. 6. Repeat steps 1 through 5, but this time use your hand to gently pull your knee toward your opposite shoulder. 7. Switch legs and repeat steps 1 through 6, even if only one hip is sore. Knee-to-chest    1. Lie on your back with your knees bent and your feet flat on the floor.   2. Bring your affected leg to your chest, keeping the other foot flat on the floor (or keeping the other leg straight, whichever feels better on your lower back). 3. Keep your lower back pressed to the floor. Hold for at least 15 to 30 seconds. 4. Relax, and lower the knee to the starting position. 5. Repeat 2 to 4 times. 6. Switch legs and repeat steps 1 through 5, even if only one hip is sore. 7. To get more stretch, put your other leg flat on the floor while pulling your knee to your chest.  Clamshell    1. Lie on your side, with your affected hip on top. Keep your feet and knees together and your knees bent. 2. Raise your top knee, but keep your feet together. Do not let your hips roll back. Your legs should open up like a clamshell. 3. Hold for 6 seconds. 4. Slowly lower your knee back down. Rest for 10 seconds. 5. Repeat 8 to 12 times. 6. Switch legs and repeat steps 1 through 5, even if only one hip is sore. Follow-up care is a key part of your treatment and safety. Be sure to make and go to all appointments, and call your doctor if you are having problems. It's also a good idea to know your test results and keep a list of the medicines you take. Where can you learn more? Go to http://www.[employer].com/  Enter P975 in the search box to learn more about \"Hip Arthritis: Exercises. \"  Current as of: July 1, 2021               Content Version: 13.0  © 8613-1369 [employer]. Care instructions adapted under license by [employer] (which disclaims liability or warranty for this information). If you have questions about a medical condition or this instruction, always ask your healthcare professional. [place] [address] any warranty or liability for your use of this information.

## 2021-12-08 NOTE — PROGRESS NOTES
Chief Complaint   Patient presents with    Annual Wellness Visit    Cholesterol Problem     Fasting    Thyroid Problem       HISTORY OF PRESENT ILLNESS   HPI  Fasting follow up mild, diet controlled hypercholesterolemia, hypothyroidism, and lab check. Diet: sensible, well balanced diet  Caffeine: none, except occ chocolate  Exercise: hikes none, except occ chocolatealot w/ , walks regularly ~ 45 minutes to an hour almost every day (3 miles); tennis seasonally ~ 1-2 x a week; occ bikes  Weight: pretty stable, usually runs ~ 120 range  Concerns: Chronic left hip pain  Saw Ortho 3-2021. Reviewed chart notes. Xrays revealed moderate OA. Not given CSI. Advised to manage conservatively. She is very active, exercises regularly and enjoys hiking several times a year. States even though she has always been so active, she feels her Bebo Cam is catching up w/ her\". Feels like she has had to scale back a bit from her usual level of intensity because of her hip and knee issues. Got a CSI right knee per Dr. Royal Amanda in June and that really helped a lot. Her knee as doing better now. But the hip continues to be an issue. She feels like it at times is now starting to compromise her quality of life. Stiff, aching pains. Aggravated after higher intensity exercise, longer walks or longer hiking excursions. She takes either Tylenol or Ibuprofen 1-2 x a week w/ some temporary relief. She tries to do home stretches which feel good during the time but arent lasting. She is still ambulating independently and still able to perform all of her ADL's, but it is upsetting to her that her hip is limiting her more. No weakness or paresthesias. REVIEW OF SYMPTOMS   Review of Systems   Constitutional: Negative. HENT: Negative. Eyes: Negative. Respiratory: Negative. Cardiovascular: Negative. Gastrointestinal: Negative. Genitourinary: Negative. Neurological: Negative. Endo/Heme/Allergies: Negative. Psychiatric/Behavioral: Negative.           PROBLEM LIST/MEDICAL HISTORY     Problem List  Date Reviewed: 12/8/2021          Codes Class Noted    Primary osteoarthritis of left hip ICD-10-CM: M16.12  ICD-9-CM: 715.15  12/8/2021    Overview Signed 12/8/2021  9:46 AM by Emily Sandoval MD     Xray by Bruce Radford 3-2021, conservative             Primary osteoarthritis of right knee ICD-10-CM: M17.11  ICD-9-CM: 715.16  12/8/2021    Overview Signed 12/8/2021  9:47 AM by Emily Sandoval MD     Ortho Dr. Husam Ya . [phone]             ACP (advance care planning) ICD-10-CM: Z71.89  ICD-9-CM: V65.49  11/27/2017    Overview Signed 11/27/2017  9:51 AM by Emily Sandoval MD     Patient has an AMD. ACP note              Hypothyroidism, adult ICD-10-CM: E03.9  ICD-9-CM: 244.9  11/20/2015    Overview Signed 11/20/2015 10:01 AM by Emily Sandoval MD     Dx 2008; borderline tests 2003             S/P Fever of unknown origin (FUO), Suspected Viral Illness ICD-10-CM: R50.9  ICD-9-CM: 780.60  9/29/2014    Overview Signed 9/29/2014 12:51 PM by Emily Sandoval MD     7/2014-8/2014: ID Dr Stephanie Chiang             Mild Hypercholesterolemia with Excellent HDL ICD-10-CM: E78.00  ICD-9-CM: 272.0  11/14/2013        Screening exams for skin cancer ICD-10-CM: Z12.83  ICD-9-CM: V76.43  11/14/2013    Overview Signed 11/14/2013  3:11 PM by Emily Sandoval MD     Derm: Previously Dr Luz Maria Miguel, then Dr Víctor Wong             Right Inguinal hernia, mild ICD-10-CM: K40.90  ICD-9-CM: 550.90  9/29/2010    Overview Signed 9/29/2010 10:00 AM by Emily Sandoval MD     [id number]             S/P colonoscopy ICD-10-CM: C85.331  ICD-9-CM: V45.89  9/29/2010    Overview Signed 9/29/2010 10:01 AM by Emily Sandoval MD     2002 benign polyp; 2007, normal; f/u 5 yrs; Dr Samia Colvin             Family history of colon cancer ICD-10-CM: Z80.0  ICD-9-CM: V16.0  4/22/2010    Overview Signed 4/22/2010  9:50 AM by Chan Damon     sister             Family history of breast cancer ICD-10-CM: Z80.3  ICD-9-CM: V16.3  4/22/2010    Overview Signed 4/22/2010  9:50 AM by Chan Damon     sister             History of mild iron deficiency anemia ICD-10-CM: Z86.2  ICD-9-CM: V12.3  4/22/2010        Hypothyroidism ICD-10-CM: E03.9  ICD-9-CM: 244.9  4/22/2010    Overview Addendum 9/29/2010  9:59 AM by Rachel Davis MD     Dx 2008; borderline tests 2003             Postmenopausal, LMP 2006, No HRT ICD-10-CM: Z78.0  ICD-9-CM: V49.81  4/22/2010    Overview Signed 4/22/2010  9:52 AM by Chan Damon     LMP 10/2006 no HRT             Benign SQ Mass on right back ICD-10-CM: R22.2  ICD-9-CM: 782.2  4/22/2010    Overview Signed 4/22/2010  9:53 AM by Chan Damon     1980's R low back mass Lipoma vs Cyst                       PAST SURGICAL HISTORY     Past Surgical History:   Procedure Laterality Date    BIOPSY BREAST  1981    negative, left breast; Dr Tristen Major  2002    benign polyp; Dr Colby Fret  11/2007    normal; ok x 5yrs; Dr Kristine Aparicio; f/u 5 yrs    DEXA BONE DENSITY STUDY AXIAL  10/2001    normal    DEXA BONE DENSITY STUDY AXIAL  10/2007    normal    EKG ORDERABLE  9/2008    Sinus bradycardia    EKG ORDERABLE  9/2009    Sinus bradycardia    HM DEXA SCAN  10/2009, 10/2011    VPI (scanned reports)    HX BREAST BIOPSY Left 1982    years ago; neg    HX COLONOSCOPY  01/17/2013    Internal Hemorrhoids; repeat 5 yrs due to fam hx; Dr Husam Jones HX COLONOSCOPY  02/2018    Internal Hemorrhoids, Polyp; repeat 5 yrs due to fam hx; Dr Nevarez Phlegm    varicose veins, Dr Nathaly Harris Left 06/2016    for varicose veins    XR HIP RT W OR WO PELV 2-3 VWS  9/2008    normal         MEDICATIONS     Current Outpatient Medications   Medication Sig    calcium-cholecalciferol, d3, (CALCIUM 600 + D) 600-125 mg-unit tab Take 1 Tablet by mouth daily.  levothyroxine (SYNTHROID) 25 mcg tablet TAKE 1 TABLET EVERY DAY  BEFORE  BREAKFAST    CHOLECALCIFEROL, VITAMIN D3, (VITAMIN D3 PO) Take 2,000 Units by mouth Three (3) times a week. No current facility-administered medications for this visit. ALLERGIES     Allergies   Allergen Reactions    Pcn [Penicillins] Other (comments)     As child-can't remember          SOCIAL HISTORY     Social History     Tobacco Use    Smoking status: Never Smoker    Smokeless tobacco: Never Used   Substance Use Topics    Alcohol use: Yes     Comment: 1 glass wine 2-3 times/week     Social History     Social History Narrative    , 3 children    2 children in [place], [place], 1 daughter in [place]    Patient and  have an Advance Directive.     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; booster 77 yo        Diet: sensible, well balanced diet    Caffeine: none, except occ chocolate    Exercise: hikes none, except occ chocolatealot w/ , walks regularly ~ 45 minutes to an hour almost every day (3 miles); tennis seasonally ~ 1-2 x a week; occ bikes    Weight: pretty stable, usually runs ~ 120 range         Social History     Substance and Sexual Activity   Sexual Activity Yes    Partners: Male       IMMUNIZATIONS     Immunization History   Administered Date(s) Administered    COVID-19, Moderna, Primary or Immunocompromised Series, MRNA, PF, 100mcg/0.5mL 01/01/2021, 01/29/2021    COVID-19, PFIZER, MRNA, LNP-S, PF, 30MCG/0.3ML DOSE 09/23/2021    Hep A Vaccine 05/20/2013    Hep A Vaccine (Adult) 02/12/2014    Influenza High Dose Vaccine PF 11/07/2016, 11/10/2017, 09/15/2018, 10/19/2019, 09/25/2020    Influenza Vaccine 10/15/2013, 10/14/2014, 10/31/2015, 10/25/2021    Influenza Vaccine Split 10/10/2012    Influenza Vaccine Whole 10/04/2011    Influenza, Quadrivalent, Adjuvanted (>65 Yrs FLUAD QUAD [id number]) 09/25/2020    Pneumococcal Conjugate (PCV-13) 2016    Pneumococcal Polysaccharide (PPSV-23) 10/01/2010, 2015    TD Vaccine 2008    Td 2019    Typhoid Vi Capsular Polysaccharide Vaccine 2013    Yellow Fever Vaccine 2013    Zoster Recombinant 2019, 2019    Zoster Vaccine, Live 2003         FAMILY HISTORY     Family History   Problem Relation Age of Onset    Cancer Mother         lung cancer    Hypertension Mother     OSTEOARTHRITIS Mother         obese    Diabetes Father     Cancer Father         bladder cancer and BCC skin cancer    Colon Cancer Sister          age 52    OSTEOARTHRITIS Sister    [facility] Breast Cancer Sister         diagnosed age ~ 62, survivor    Depression Son     Anxiety Son     Breast Cancer Other         great aunt         VITALS     Visit Vitals  /74 (BP 1 Location: Left upper arm, BP Patient Position: Sitting, BP Cuff Size: Adult)   Pulse (!) 55   Temp 98.1 °F (36.7 °C) (Oral)   Resp 16   Ht 5' 4\" (1.626 m)   Wt 118 lb 3.2 oz (53.6 kg)   SpO2 97%   BMI 20.29 kg/m²          PHYSICAL EXAMINATION   Physical Exam  Vitals reviewed. Constitutional:       General: She is not in acute distress. Appearance: Normal appearance. HENT:      Right Ear: Tympanic membrane normal.      Left Ear: Tympanic membrane normal.   Eyes:      Conjunctiva/sclera: Conjunctivae normal.   Neck:      Thyroid: No thyroid mass, thyromegaly or thyroid tenderness. Vascular: No carotid bruit. Cardiovascular:      Rate and Rhythm: Normal rate and regular rhythm. Heart sounds: Normal heart sounds. Pulmonary:      Effort: Pulmonary effort is normal.      Breath sounds: Normal breath sounds. Abdominal:      General: There is no distension. Palpations: Abdomen is soft. Tenderness: There is no abdominal tenderness. Musculoskeletal:         General: No swelling or tenderness. Cervical back: Neck supple. Lumbar back: No tenderness or bony tenderness.  Normal range of motion. Left hip: No tenderness or bony tenderness. Normal range of motion. Normal strength. Right lower leg: No edema. Left lower leg: No edema. Comments: Good ROM BLE   Lymphadenopathy:      Cervical: No cervical adenopathy. Neurological:      General: No focal deficit present. Mental Status: She is alert. Motor: Motor function is intact. Coordination: Coordination is intact. Gait: Gait normal.      Deep Tendon Reflexes: Reflexes are normal and symmetric. ASSESSMENT & PLAN   Diagnoses and all orders for this visit:    1. Medicare annual wellness visit, subsequent  See separate note under this same encounter visit for Medicare Wellness note. 2. Mild Hypercholesterolemia with Excellent HDL, controlled w/ healthy lifestyle  -     METABOLIC PANEL, COMPREHENSIVE; Future  -     LIPID PANEL; Future    3. Hypothyroidism, adult, clinically stable  -     CBC W/O DIFF; Future  -     METABOLIC PANEL, COMPREHENSIVE; Future  -     TSH 3RD GENERATION; Future  -     T4, FREE; Future    4. Primary osteoarthritis of left hip  -     REFERRAL TO ORTHOPEDICS  -     diclofenac (VOLTAREN) 1 % gel; Apply 4 g to affected area four (4) times daily as needed (for hip or knee pain). Tylenol Arthritis 2 tabs bid   Hip arthritis exercises, handouts given   May benefit from Jael Brand [address]. Defer to Ortho      Fasting labs checked today  Cardiovascular risk and specific lipid/LDL goals assessed  Reviewed diet, nutrition, exercise, weight management, BMI/goals. Age/risk based screening recommendations, health maintenance & prevention counseling. Cancer screening USPTFS guidelines reviewed w/ pt today. Discussed benefits/positive/negative outcomes of screening based on age/risk stratification. Informed consent for/against screening based on pt's personal hx/risk factors. Updated in history above and health maintenance. Further follow up & other recommendations pending review of labs.  If all remains good and stable, follow up in 1 yr, sooner prn

## 2021-12-08 NOTE — PROGRESS NOTES
This is the Subsequent Medicare Annual Wellness Exam, performed 12 months or more after the Initial AWV or the last Subsequent AWV    I have reviewed the patient's medical history in detail and updated the computerized patient record. Assessment/Plan   Education and counseling provided:  Are appropriate based on today's review and evaluation    1. Medicare annual wellness visit, subsequent       Depression Risk Factor Screening     3 most recent PHQ Screens 12/8/2021   Little interest or pleasure in doing things Not at all   Feeling down, depressed, irritable, or hopeless Not at all   Total Score PHQ 2 0       Alcohol Risk Screen    Do you average more than 1 drink per night or more than 7 drinks a week:  No    On any one occasion in the past three months have you have had more than 3 drinks containing alcohol:  No        Functional Ability and Level of Safety    Hearing: Hearing is good. Activities of Daily Living: The home contains: no safety equipment. Patient does total self care  ADL Assessment 12/8/2021   Feeding yourself No Help Needed   Getting from bed to chair No Help Needed   Getting dressed No Help Needed   Bathing or showering No Help Needed   Walk across the room (includes cane/walker) No Help Needed   Using the telphone No Help Needed   Taking your medications No Help Needed   Preparing meals No Help Needed   Managing money (expenses/bills) No Help Needed   Moderately strenuous housework (laundry) No Help Needed   Shopping for personal items (toiletries/medicines) No Help Needed   Shopping for groceries No Help Needed   Driving No Help Needed   Climbing a flight of stairs No Help Needed   Getting to places beyond walking distances No Help Needed         Ambulation: with no difficulty     Fall Risk:  Fall Risk Assessment, last 12 mths 12/8/2021   Able to walk? Yes   Fall in past 12 months? 0   Do you feel unsteady?  0   Are you worried about falling 0      Abuse Screen:  Patient is not abused       Cognitive Screening    Has your family/caregiver stated any concerns about your memory: no         Health Maintenance Due   There are no preventive care reminders to display for this patient.     Patient Care Team   Patient Care Team:  Alexander Moeller MD as PCP - General  Alexander Moeller MD as PCP - [facility] Empaneled Provider    History     Patient Active Problem List   Diagnosis Code    Family history of colon cancer Z80.0    Family history of breast cancer Z80.3    History of mild iron deficiency anemia Z86.2    Hypothyroidism E03.9    Postmenopausal, LMP 2006, No HRT Z78.0    Benign SQ Mass on right back R22.2    Right Inguinal hernia, mild K40.90    S/P colonoscopy Z98.890    Mild Hypercholesterolemia with Excellent HDL E78.00    Screening exams for skin cancer Z12.83    S/P Fever of unknown origin (FUO), Suspected Viral Illness R50.9    Hypothyroidism, adult E03.9    ACP (advance care planning) Z71.89    Primary osteoarthritis of left hip M16.12    Primary osteoarthritis of right knee M17.11     Past Medical History:   Diagnosis Date    ACP (advance care planning) 11/27/2017    Patient has an AMD. ACP note     Benign SQ Mass on right back 4/22/2010    Family history of breast cancer 4/22/2010    Family history of colon cancer 4/22/2010    History of mild iron deficiency anemia 4/22/2010    Hypothyroidism 4/22/2010    Iron deficiency anemia 4/22/2010    Mass on back 4/22/2010    Mild Hypercholesterolemia with Excellent HDL 11/14/2013    Postmenopausal 4/22/2010    Postmenopausal, LMP 2006, No HRT 4/22/2010    Primary osteoarthritis of left hip 12/8/2021    Xray by Lee Olsen 3-2021, conservative    Primary osteoarthritis of right knee 12/8/2021    Ortho Dr. Humaira Loredo, [facility], [facility] [phone]    Right Inguinal hernia, mild 9/29/2010    S/P colonoscopy 9/29/2010    S/P Fever of unknown origin (FUO), Suspected Viral Illness 9/29/2014 7/2014-8/2014: ID Dr Mary Abebe  SAB (spontaneous )     Screening exams for skin cancer 2013    Derm: Previously Dr Emi Walden, then Dr Elba Cochran      Past Surgical History:   Procedure Laterality Date   [address]    negative, left breast; Dr Celso Mcgowan      benign polyp; Dr Manas Feliciano COLONOSCOPY  2007    normal; ok x 5yrs; Dr Thurman Ends; f/u 5 yrs    DEXA BONE DENSITY STUDY AXIAL  10/2001    normal    DEXA BONE DENSITY STUDY AXIAL  10/2007    normal    EKG ORDERABLE  2008    Sinus bradycardia    EKG ORDERABLE  2009    Sinus bradycardia    HM DEXA SCAN  10/2009, 10/2011    VPI (scanned reports)    HX BREAST BIOPSY Left 1982    years ago; neg    HX COLONOSCOPY  2013    Internal Hemorrhoids; repeat 5 yrs due to fam hx; Dr Sheldon Molina HX COLONOSCOPY  2018    Internal Hemorrhoids, Polyp; repeat 5 yrs due to fam hx; Dr Riky Wolf    varicose veins, Dr Autumn Dnune Left 2016    for varicose veins    XR HIP RT W OR WO PELV 2-3 VWS  2008    normal     Current Outpatient Medications   Medication Sig Dispense Refill    calcium-cholecalciferol, d3, (CALCIUM 600 + D) 600-125 mg-unit tab Take 1 Tablet by mouth daily.  levothyroxine (SYNTHROID) 25 mcg tablet TAKE 1 TABLET EVERY DAY  BEFORE  BREAKFAST 90 Tab 3    CHOLECALCIFEROL, VITAMIN D3, (VITAMIN D3 PO) Take 2,000 Units by mouth Three (3) times a week.        Allergies   Allergen Reactions    Pcn [Penicillins] Other (comments)     As child-can't remember       Family History   Problem Relation Age of Onset    Cancer Mother         lung cancer    Hypertension Mother     OSTEOARTHRITIS Mother         obese    Diabetes Father     Cancer Father         bladder cancer and BCC skin cancer    Colon Cancer Sister          age 52    OSTEOARTHRITIS Sister    [facility] Breast Cancer Sister         diagnosed age ~ 62, survivor    Depression Son    [facility] Anxiety Son     Breast Cancer Other         great aunt     Social History     Tobacco Use    Smoking status: Never Smoker    Smokeless tobacco: Never Used   Substance Use Topics    Alcohol use: Yes     Comment: 1 glass wine 2-3 times/week         Ferimn Flores MD

## 2021-12-08 NOTE — PROGRESS NOTES
Chief Complaint   Patient presents with    Annual Wellness Visit    Cholesterol Problem     fasting    Thyroid Problem     1. \"Have you been to the ER, urgent care clinic since your last visit? Hospitalized since your last visit? \" Yes Where: [facility] Feb for a dog bite    2. \"Have you seen or consulted any other health care providers outside of the [address] [place] [address] since your last visit? \" Yes Where: [facility] ~ [facility] for knee   3. For patients over 45: Has the patient had a colonoscopy? In system    If the patient is female:    4. For patients over 40: Has the patient had a mammogram? In Feb     5. For patients over 21: Has the patient had a pap smear?  NA

## 2021-12-08 NOTE — TELEPHONE ENCOUNTER
Chief Complaint   Patient presents with    Prior Auth     Diclofenac Sodium 1% gel:  APPROVED:  01/01/2021 THROUGH 12/31/2021. [address] [place] [address] was faxed approval notification at [phone] with confirmation received.   Perry Gann LPN

## 2022-01-11 DIAGNOSIS — E03.9 HYPOTHYROIDISM, ADULT: ICD-10-CM

## 2022-01-11 RX ORDER — LEVOTHYROXINE SODIUM 25 UG/1
TABLET ORAL
Qty: 90 TABLET | Refills: 3 | Status: SHIPPED | OUTPATIENT
Start: 2022-01-11

## 2022-01-19 ENCOUNTER — TRANSCRIBE ORDER (OUTPATIENT)
Dept: SCHEDULING | Age: 77
End: 2022-01-19

## 2022-01-19 DIAGNOSIS — Z12.31 SCREENING MAMMOGRAM FOR HIGH-RISK PATIENT: Primary | ICD-10-CM

## 2022-02-17 ENCOUNTER — HOSPITAL ENCOUNTER (OUTPATIENT)
Dept: MAMMOGRAPHY | Age: 77
Discharge: HOME | End: 2022-02-17
Attending: FAMILY MEDICINE
Payer: MEDICARE

## 2022-02-17 DIAGNOSIS — Z12.31 SCREENING MAMMOGRAM FOR HIGH-RISK PATIENT: ICD-10-CM

## 2022-02-17 PROCEDURE — 77063 BREAST TOMOSYNTHESIS BI: CPT

## 2022-03-18 PROBLEM — M17.11 PRIMARY OSTEOARTHRITIS OF RIGHT KNEE: Status: ACTIVE | Noted: 2021-12-08

## 2022-03-19 PROBLEM — M16.12 PRIMARY OSTEOARTHRITIS OF LEFT HIP: Status: ACTIVE | Noted: 2021-12-08

## 2022-03-19 PROBLEM — Z71.89 ACP (ADVANCE CARE PLANNING): Status: ACTIVE | Noted: 2017-11-27

## 2022-11-21 ENCOUNTER — TELEPHONE (OUTPATIENT)
Dept: FAMILY MEDICINE CLINIC | Age: 77
End: 2022-11-21

## 2022-11-21 DIAGNOSIS — Z23 NEED FOR PROPHYLACTIC VACCINATION WITH TYPHOID-PARATYPHOID ALONE (TAB): ICD-10-CM

## 2022-11-21 DIAGNOSIS — Z29.8 NEED FOR MALARIA PROPHYLAXIS: Primary | ICD-10-CM

## 2022-11-21 NOTE — TELEPHONE ENCOUNTER
----- Message from Tavia Maldonado sent at 11/20/2022 10:44 PM EST -----  Regarding: Prescription request  My  and I are traveling to [facility] in December, and he contacted you a week or so ago regarding our medicine needs ,and you kindly helped him with prescriptions. I, too,  have looked at my prescriptions for our last trip to [place] and at the current requirements, and I know what I need for our upcoming trip. My yellow fever vaccination from our first trip is still good. I will need to get the typhoid vaccine and would prefer the Vivotif Abebe EC capsules. I will also need malaria pills and would like the same generic drug I had before - Atovaquone/proguanil - oral. I will need 21 tablets for the proper dosage before and after my travel. Please direct these two orders to [facility] at [address] [facility]. I have also decided to get the Hep B vaccination; it is optional, but seems like a good idea, and I got the first of three shots for that on Friday, November 18th, at [address]. I'll get the second in 4 weeks. Thanks for taking care of these requests for the   typhoid and malaria meds. I will be in for my annual physical on Dec. 9th--see you then! Adult Cystic Fibrosis Program  Social Work Phone/E-mail Communication    Data:   Received phone call from Jovany indicating that he got a new job (in his field) that offers health insurance and he is needing to pick a plan.  He wondered if he could send writer the information on the different policy options to review.       Jovany e-mailed writer the insurance information for review.  SW reviewed the information and gave him suggestions.  Also notified Debra Taylor, CF pharmacy tech liaison to see if we can help Jovany apply for Health Well as he thinks that he will qualify based on the income guidelines.      See e-mail communication for details:     Good Afternoon Kaite,     I have attached the options I have available to me for health insurance. These are the two options and I want to know which one is going to be the best and cover me the most after I meet my deductible. Obviously I have a lot of prescriptions and stuff filled every month, and have many visits throughout the year. Than depending on which one I choose is there the possibility getting my medications moved around so they are covered, and I am not spending a bunch of money out of pocket.     The  Insurance Back  is the back of both plans.  Let me know your thoughts. You can give me a call on Monday or Tuesday and let me know what you think or email me.       Thank you,       Jovany Abbasi    [facility]    Cell: [phone]    Email: [email or web address]      Hi Jovany,     I had a chance to review the 2 different insurance policy options.  I will include my thoughts and next steps in this email but please call me if you would like to talk further over the phone in more detail or if  you have questions.     The two plans have very similar coverage- you will want to MAKE SURE that [facility] is in-network.  The Out of Network coverage is VERY expensive.  The only way to check on this is to talk with the  insurance company or see if they have an online portal that you can view this information.  Although we take BCBS, it is up to the individual policy which health care organizations on in-network.  I would check on this first before enrolling.     If we are in network-    The Gold Plan seems to make more financial sense because you utilize your insurance and you would save approximately $400 per year with this plan even though you are paying the premium.     Medication costs are the same coverage for each plan- I will give Debra Taylor- Our pharmacy tech a heads up and you can likely utilize a co-pay card to help reduce the cost of your medication co-pays for your specialty medications.     Do you have Health Well?  This is another co-pay assistance program that we can look into but eligibility is based on your income.  If you make less than $64,400 per year you may qualify- please let us know if this is something you are interested in applying for as they will help pay for up to $15,000 per year for certain CF medication co-pays.      Another thing to consider asking about is whether your company offers HSA (health savings account) or FSA (Medical Flex Spending Account) as a way to put pre-tax money into an account for your medical bills each year.     Let me know what questions you have- either email me back or give me a call.     Thank you!     Prerna Paniagua, [facility]  Adult Cystic Fibrosis           Prerna,     So a few question came up in my head while I sat and thought about this.     1.)    Would I be better off just going through [employer] myself and finding an option that works for me. There is one I looked at that would cover me for medical, dental, and vision and only cost me $87/ month. That one is called the Medicare Advantage Choice plan. I am not sure if your familiar with that plan, or if you could provide me more information on that.     2.)    I do not have Health Well and have  never heard of that before, but I would be interested in that. I would like to do whatever is best coverage for me, but also not going to cost me a fortune either.     I am not exactly sure how insurance works, I have never really had it explained to me clearly. I just know that I would have to pay so much before the insurance company start to pay, and than I would pay so whatever they do not cover after I reach a certain amount, but I only pay so much a year.     If you would like to call me instead you certainly can I am available whenever my phone is always on me.       Thank you,     Jovany Abbsai    [facility]    Cell: [phone]    Email: [email or web address]    Medicare insurance plans would not be an option for you as far as I know as this is a federal insurance program for people who are either certified disabled through the social security administration or are 65+.  Have you ever been on social security?  Medicare plans for our patients are actually more expensive typically, although it might cost less for the premium- medication co-pays are usually higher, etc.     To be honest- the insurance offered through your work is not outrageously expensive- yes, it will be an adjustment but over time I think it will be manageable for you.  Can you give me a sense of how much you will be making annually?  This will give me a better idea of possible options such as Health Well or help determine whether you would qualify for a tax credit if you decided to purchase your on insurance through the [place] [employer].      I am happy to discuss this over the phone as well- with just you or if you want your mom or dad to join in (if you think that would be helpful).  You will learn as you go-I know it s overwhelming at first to be under your own insurance plan.  I am also happy to help you call your insurance company to inquire about in-network providers.      Just let me know a time that  works for me to call or give me a call at your convenience and we can talk through this more if you d like.        Thank you!         Prerna Paniagua, [facility]  Adult Cystic Fibrosis     [facility]        Good morning Kaite,     I just wanted to bring up that insurance I was seeing, but I suppose that I would not qualify for that I did not pay attention to that. I have never been on social security as I have always had a job.     If you think the insurance through my work is my best option than I think so as well. I just wanted to be sure and get some more information from someone that deals with it more than I do. I have talked to my boss, and she is supposed to be getting ahold of our agent so she can get me the phone number to be sure that the clinic and the pharmacy are all in network for our plan.     I would like to enroll for Health Well as I went on their website and did some looking around and my income is under the family of 1 amount. I think that would be very beneficial to me for my specialty meds, if I needed any new equipment, or ended up having a hospital stay as well.     The best time would be this Friday during the day probably around 11 am as I have the day off. Let me know if that works for you.     Thank you,     Demario Hanks!         I hope you had a wonderful Thanksgiving!     Just wanted to follow-up with you to check and see if you were able to sign up for insurance benefits or if you still had questions?     Let me know if you do still have questions, otherwise just reach out as needed.     Thank you,     Prerna Graff,     Hope you had a great Thanksgiving as well.     I did have my employer sign me up for insurance benefits. I was able to talk with someone that does our insurance policy, and everything was in network and my prescriptions were all covered with a little co-pay. I had them sign me up for the better plan of the  two the one we had talked about, which I think was called the Gold Plan or something like that.       Thank you for helping I appreciate it.     Demario Hanks,    Awesome-sounds good!     Let us know if you need anything once you start filling meds, etc.      Thanks!     Prerna Paniagua [facility]  Adult Cystic Fibrosis     [facility]

## 2022-11-22 RX ORDER — ATOVAQUONE AND PROGUANIL HYDROCHLORIDE 250; 100 MG/1; MG/1
1 TABLET, FILM COATED ORAL DAILY
Qty: 21 TABLET | Refills: 0 | Status: SHIPPED | OUTPATIENT
Start: 2022-11-22

## 2022-11-22 RX ORDER — ATOVAQUONE AND PROGUANIL HYDROCHLORIDE 250; 100 MG/1; MG/1
1 TABLET, FILM COATED ORAL DAILY
Qty: 21 TABLET | Refills: 0 | Status: CANCELLED | OUTPATIENT
Start: 2022-11-22

## 2022-11-22 NOTE — TELEPHONE ENCOUNTER
Orders placed and sent to pharmacy. I didn't have a way to respond to her [employer] message to let her know.

## 2022-12-08 ENCOUNTER — TRANSCRIBE ORDER (OUTPATIENT)
Dept: SCHEDULING | Age: 77
End: 2022-12-08

## 2022-12-08 DIAGNOSIS — Z12.31 VISIT FOR SCREENING MAMMOGRAM: Primary | ICD-10-CM

## 2023-01-16 DIAGNOSIS — E03.9 HYPOTHYROIDISM, ADULT: ICD-10-CM

## 2023-01-16 RX ORDER — LEVOTHYROXINE SODIUM 25 UG/1
TABLET ORAL
Qty: 90 TABLET | Refills: 0 | Status: SHIPPED | OUTPATIENT
Start: 2023-01-16

## 2023-01-16 NOTE — TELEPHONE ENCOUNTER
Last Visit: 12/8/21 MD MAAYA, labs 12/2021  Next Appointment: 3/8/23 MD AMAYA, labs due  Previous Refill Encounter(s): 1/11/22 90 + 3 (last filled 10/16/22)    Requested Prescriptions     Pending Prescriptions Disp Refills    levothyroxine (SYNTHROID) 25 mcg tablet 90 Tablet 0     Sig: TAKE ONE TABLET BY MOUTH EVERY MORNING BEFORE BREAKFAST     For Pharmacy Admin Tracking Only    Program: Medication Refill  CPA in place:   Recommendation Provided To:    Intervention Detail: New Rx: 1, reason: Patient Preference  Intervention Accepted By:   Shakira Irwin Closed?:   Time Spent (min): 5

## 2023-01-27 ENCOUNTER — TELEPHONE (OUTPATIENT)
Dept: FAMILY MEDICINE CLINIC | Age: 78
End: 2023-01-27

## 2023-01-27 DIAGNOSIS — U07.1 UPPER RESPIRATORY TRACT INFECTION DUE TO COVID-19 VIRUS: Primary | ICD-10-CM

## 2023-01-27 DIAGNOSIS — J06.9 UPPER RESPIRATORY TRACT INFECTION DUE TO COVID-19 VIRUS: Primary | ICD-10-CM

## 2023-01-27 NOTE — TELEPHONE ENCOUNTER
Patient called stating that she tested positive for Covid this morning, and is hoping to get some Paxlovid for her symptoms. She is hoping this could be sent in today that way she does not have to wait throughout the weekend.  Patients does prefer [facility] [address]    Best call back number  [phone]

## 2023-01-27 NOTE — TELEPHONE ENCOUNTER
Pt has low grade temp, cough, head congestion. Went over Dr Donald Méndez rec to start taking Vit C 1000 mg , Vit D3 1000 units and zinc 50 mg everyday. Treat symptoms with over the counter medications ie tylenol for fever, head aches, body aches. Mucinex DM 12 hour for cough. If you get high fever, shortness of breath then go to ER. Desire Garcia her know that Dr Donald Méndez not in office today.   Pt wants paxlovid and would like it sent to [facility] [address]

## 2023-01-28 ENCOUNTER — TELEPHONE (OUTPATIENT)
Dept: FAMILY MEDICINE CLINIC | Age: 78
End: 2023-01-28

## 2023-01-28 NOTE — TELEPHONE ENCOUNTER
01/28/23  2:21 PM    On Call Phone Encounter Note    Received message that patient had COVID and requested to speak with physician. Called patient. She reports having COVID since Thursday, and she reports having fever. She is requesting treatment with Paxlovid. Advised going to urgent care for further evaluation and treatment. She expresses understanding.      Toro Muñoz MD

## 2023-02-20 ENCOUNTER — TELEPHONE (OUTPATIENT)
Dept: FAMILY MEDICINE CLINIC | Age: 78
End: 2023-02-20

## 2023-02-20 DIAGNOSIS — Z86.2 HISTORY OF IRON DEFICIENCY ANEMIA: ICD-10-CM

## 2023-02-20 DIAGNOSIS — E03.9 HYPOTHYROIDISM, ADULT: ICD-10-CM

## 2023-02-20 DIAGNOSIS — E78.00 HYPERCHOLESTEROLEMIA: Primary | ICD-10-CM

## 2023-02-20 NOTE — TELEPHONE ENCOUNTER
----- Message from Lizbeth Bloch sent at 2/20/2023 10:02 AM EST -----  Regarding: labs order  My annual physical at the end of 2022 was cancelled by you and rescheduled for March 8, 2023 at 1:30. I was told to contact you ahead of time to request the order for my lab work so that I can have it done before the appt for my physical.   I would like to get the lab work done early this Wednesday morning 2/22/23 at the [address] [place] [address], a [facility] facility, since I will be in that area then and it will be convenient to fast before the draw. Please submit the order so that they can access it then, and please let me know that this has been done before I go there. Thank you!

## 2023-02-22 ENCOUNTER — HOSPITAL ENCOUNTER (OUTPATIENT)
Dept: MAMMOGRAPHY | Age: 78
Discharge: HOME | End: 2023-02-22
Attending: FAMILY MEDICINE
Payer: MEDICARE

## 2023-02-22 DIAGNOSIS — Z12.31 VISIT FOR SCREENING MAMMOGRAM: ICD-10-CM

## 2023-02-22 PROCEDURE — 77063 BREAST TOMOSYNTHESIS BI: CPT

## 2023-03-08 ENCOUNTER — OFFICE VISIT (OUTPATIENT)
Dept: FAMILY MEDICINE CLINIC | Age: 78
End: 2023-03-08
Payer: MEDICARE

## 2023-03-08 VITALS
OXYGEN SATURATION: 98 % | TEMPERATURE: 98.2 F | RESPIRATION RATE: 16 BRPM | HEIGHT: 63 IN | BODY MASS INDEX: 21.33 KG/M2 | HEART RATE: 72 BPM | WEIGHT: 120.4 LBS | SYSTOLIC BLOOD PRESSURE: 118 MMHG | DIASTOLIC BLOOD PRESSURE: 68 MMHG

## 2023-03-08 DIAGNOSIS — Z00.00 MEDICARE ANNUAL WELLNESS VISIT, SUBSEQUENT: Primary | ICD-10-CM

## 2023-03-08 DIAGNOSIS — Z78.0 POSTMENOPAUSAL STATE: ICD-10-CM

## 2023-03-08 DIAGNOSIS — E78.00 HYPERCHOLESTEROLEMIA: ICD-10-CM

## 2023-03-08 DIAGNOSIS — Z13.820 SCREENING FOR OSTEOPOROSIS: ICD-10-CM

## 2023-03-08 DIAGNOSIS — E03.9 HYPOTHYROIDISM, ADULT: ICD-10-CM

## 2023-03-08 DIAGNOSIS — H93.A2 PULSATILE TINNITUS OF LEFT EAR: ICD-10-CM

## 2023-03-08 PROCEDURE — G8510 SCR DEP NEG, NO PLAN REQD: HCPCS | Performed by: FAMILY MEDICINE

## 2023-03-08 PROCEDURE — G8536 NO DOC ELDER MAL SCRN: HCPCS | Performed by: FAMILY MEDICINE

## 2023-03-08 PROCEDURE — G0439 PPPS, SUBSEQ VISIT: HCPCS | Performed by: FAMILY MEDICINE

## 2023-03-08 PROCEDURE — G8399 PT W/DXA RESULTS DOCUMENT: HCPCS | Performed by: FAMILY MEDICINE

## 2023-03-08 PROCEDURE — G8420 CALC BMI NORM PARAMETERS: HCPCS | Performed by: FAMILY MEDICINE

## 2023-03-08 PROCEDURE — G8427 DOCREV CUR MEDS BY ELIG CLIN: HCPCS | Performed by: FAMILY MEDICINE

## 2023-03-08 PROCEDURE — 99213 OFFICE O/P EST LOW 20 MIN: CPT | Performed by: FAMILY MEDICINE

## 2023-03-08 PROCEDURE — 1090F PRES/ABSN URINE INCON ASSESS: CPT | Performed by: FAMILY MEDICINE

## 2023-03-08 PROCEDURE — 1123F ACP DISCUSS/DSCN MKR DOCD: CPT | Performed by: FAMILY MEDICINE

## 2023-03-08 PROCEDURE — G0463 HOSPITAL OUTPT CLINIC VISIT: HCPCS | Performed by: FAMILY MEDICINE

## 2023-03-08 PROCEDURE — 3017F COLORECTAL CA SCREEN DOC REV: CPT | Performed by: FAMILY MEDICINE

## 2023-03-08 PROCEDURE — 1101F PT FALLS ASSESS-DOCD LE1/YR: CPT | Performed by: FAMILY MEDICINE

## 2023-03-08 RX ORDER — LEVOTHYROXINE SODIUM 25 UG/1
TABLET ORAL
Qty: 90 TABLET | Refills: 2 | Status: SHIPPED | OUTPATIENT
Start: 2023-03-08

## 2023-03-08 NOTE — PATIENT INSTRUCTIONS
Medicare Wellness Visit, Female     The best way to live healthy is to have a lifestyle where you eat a well-balanced diet, exercise regularly, limit alcohol use, and quit all forms of tobacco/nicotine, if applicable. Regular preventive services are another way to keep healthy. Preventive services (vaccines, screening tests, monitoring & exams) can help personalize your care plan, which helps you manage your own care. Screening tests can find health problems at the earliest stages, when they are easiest to treat. [facility] follows the current, evidence-based guidelines published by the [facility] ([facility]) when recommending preventive services for our patients. Because we follow these guidelines, sometimes recommendations change over time as research supports it. (For example, mammograms used to be recommended annually. Even though Medicare will still pay for an annual mammogram, the newer guidelines recommend a mammogram every two years for women of average risk). Of course, you and your doctor may decide to screen more often for some diseases, based on your risk and your co-morbidities (chronic disease you are already diagnosed with). Preventive services for you include:  - Medicare offers their members a free annual wellness visit, which is time for you and your primary care provider to discuss and plan for your preventive service needs.  Take advantage of this benefit every year!    -Over the age of 72 should receive the recommended pneumonia vaccines.    -All adults should have a flu vaccine yearly.  -All adults should have a tetanus vaccine every 10 years.   -Over the age 48 should receive the shingles vaccines.        -All adults should be screened once for Hepatitis C.  -All adults age 38-68 who are overweight should have a diabetes screening test once every three years.   -Other screening tests and preventive services for persons with diabetes include: an eye exam to screen for diabetic retinopathy, a kidney function test, a foot exam, and stricter control over your cholesterol.   -Cardiovascular screening for adults with routine risk involves an electrocardiogram (ECG) at intervals determined by your doctor.     -Colorectal cancer screenings should be done for adults age 39-70 with no increased risk factors for colorectal cancer. There are a number of acceptable methods of screening for this type of cancer. Each test has its own benefits and drawbacks. Discuss with your doctor what is most appropriate for you during your annual wellness visit. The different tests include: colonoscopy (considered the best screening method), a fecal occult blood test, a fecal DNA test, and sigmoidoscopy.    -Lung cancer screening is recommended annually with a low dose CT scan for adults between age 54 and 68, who have smoked at least 30 pack years (equivalent of 1 pack per day for 30 days), and who is a current smoker or quit less than 15 years ago.    -A bone mass density test is recommended when a woman turns 65 to screen for osteoporosis. This test is only recommended one time, as a screening. Some providers will use this same test as a disease monitoring tool if you already have osteoporosis. -Breast cancer screenings are recommended every other year for women of normal risk, age 54-69.    -Cervical cancer screenings for women over age 72 are only recommended with certain risk factors.      Here is a list of your current Health Maintenance items (your personalized list of preventive services) with a due date:  Health Maintenance Due   Topic Date Due    Colorectal Screening  02/01/2023

## 2023-03-08 NOTE — PROGRESS NOTES
Chief Complaint   Patient presents with    Annual Wellness Visit    Cholesterol Problem     Fasting labs done 2-22-23    Thyroid Problem       HISTORY OF PRESENT ILLNESS   Follow up hypercholesterolemia, hypothyroidism and review of fasting labs done 2-22-23. Overall getting along well in general.   Diet: sensible, well balanced diet  Caffeine: none, except occ chocolate  Exercise: hikes alot w/ , walks regularly ~ 45 minutes to an hour almost every day (3 miles); tennis seasonally ~ 1-2 x a week; occ bikes  Weight: pretty stable, usually runs ~ 120 range    6 month h/o intermittent pulsatile tinnitus of left ear. Occurs about once every 1-2 months  Lasts 1-2 days  Not associated w/ ear pain, hearing loss, congestion, or vertigo  Last occurred about 1-2 weeks ago  No pattern    REVIEW OF SYMPTOMS   Review of Systems   Constitutional: Negative. HENT:  Negative for congestion, ear pain and hearing loss. Eyes: Negative. Respiratory: Negative. Cardiovascular: Negative. Gastrointestinal: Negative. Genitourinary: Negative. Neurological: Negative. Endo/Heme/Allergies: Negative. Psychiatric/Behavioral: Negative.            PROBLEM LIST/MEDICAL HISTORY     Problem List  Date Reviewed: 3/8/2023            Codes Class Noted    Primary osteoarthritis of left hip ICD-10-CM: M16.12  ICD-9-CM: 715.15  12/8/2021    Overview Signed 12/8/2021  9:46 AM by Amita Rodriguez MD     Xray by Bakari Owens 3-2021, conservative             Primary osteoarthritis of right knee ICD-10-CM: M17.11  ICD-9-CM: 715.16  12/8/2021    Overview Signed 12/8/2021  9:47 AM by Amita Rodriguez MD     Ortho Dr. Jerri Loya . [phone]             ACP (advance care planning) ICD-10-CM: Z71.89  ICD-9-CM: V65.49  11/27/2017    Overview Signed 11/27/2017  9:51 AM by Amita Rodriguez MD     Patient has an AMD. ACP note              Hypothyroidism, adult ICD-10-CM: E03.9  ICD-9-CM: 244.9  11/20/2015 Overview Signed 11/20/2015 10:01 AM by Jose Norton MD     Dx 2008; borderline tests 2003             S/P Fever of unknown origin (FUO), Suspected Viral Illness ICD-10-CM: R50.9  ICD-9-CM: 780.60  9/29/2014    Overview Signed 9/29/2014 12:51 PM by Jose Norton MD     7/2014-8/2014: ID Dr Scooter Sykes             Mild Hypercholesterolemia with Excellent HDL ICD-10-CM: E78.00  ICD-9-CM: 272.0  11/14/2013        Screening exams for skin cancer ICD-10-CM: Z12.83  ICD-9-CM: V76.43  11/14/2013    Overview Signed 11/14/2013  3:11 PM by Jose Norton MD     Derm: Previously Dr Ran Tyler, then Dr Shruti Shipley             Right Inguinal hernia, mild ICD-10-CM: K40.90  ICD-9-CM: 550.90  9/29/2010    Overview Signed 9/29/2010 10:00 AM by Jose Norton MD     [id number]             S/P colonoscopy ICD-10-CM: G98.006  ICD-9-CM: V45.89  9/29/2010    Overview Signed 9/29/2010 10:01 AM by Jose Norton MD     2002 benign polyp; 2007, normal; f/u 5 yrs; Dr Duarte Chung             Family history of colon cancer ICD-10-CM: Z80.0  ICD-9-CM: V16.0  4/22/2010    Overview Signed 4/22/2010  9:50 AM by Ashlee Rosa     sister             Family history of breast cancer ICD-10-CM: Z80.3  ICD-9-CM: V16.3  4/22/2010    Overview Signed 4/22/2010  9:50 AM by Ashlee Rosa     sister             History of mild iron deficiency anemia ICD-10-CM: Z86.2  ICD-9-CM: V12.3  4/22/2010        Hypothyroidism ICD-10-CM: E03.9  ICD-9-CM: 244.9  4/22/2010    Overview Addendum 9/29/2010  9:59 AM by Jose Norton MD     Dx 2008; borderline tests 2003             Postmenopausal, LMP 2006, No HRT ICD-10-CM: Z78.0  ICD-9-CM: V49.81  4/22/2010    Overview Signed 4/22/2010  9:52 AM by Ashlee Rosa     [facility] 10/2006 no HRT             Benign SQ Mass on right back ICD-10-CM: R22.2  ICD-9-CM: 782.2  4/22/2010    Overview Signed 4/22/2010  9:53 AM by Zeina Souza     1980's R low back mass Lipoma vs Cyst PAST SURGICAL HISTORY     Past Surgical History:   Procedure Laterality Date    BIOPSY BREAST  1981    negative, left breast; Dr Jennifer Godoy  2002    benign polyp; Dr Perdue Cordial    COLONOSCOPY  11/2007    normal; ok x 5yrs; Dr Lexy Lamar; f/u 5 yrs    DEXA BONE DENSITY STUDY AXIAL  10/2001    normal    DEXA BONE DENSITY STUDY AXIAL  10/2007    normal    EKG ORDERABLE  09/2008    Sinus bradycardia    EKG ORDERABLE  09/2009    Sinus bradycardia    HM DEXA SCAN  10/2009, 10/2011    VPI (scanned reports)    HX BREAST BIOPSY Left 1982    years ago; neg    HX COLONOSCOPY  01/17/2013    Internal Hemorrhoids; repeat 5 yrs due to fam hx; Dr Carlos Norton COLONOSCOPY  02/2018    Internal Hemorrhoids, Polyp; repeat 5 yrs due to fam hx; Dr Carlos Norton COLONOSCOPY      Scheduled for 4-2023 Dr. Ge Rubio    varicose veins, Dr Helena Benito Left 06/2016    for varicose veins    XR HIP RT W OR WO PELV 2-3 VWS  09/2008    normal         MEDICATIONS     Current Outpatient Medications   Medication Sig    levothyroxine (SYNTHROID) 25 mcg tablet TAKE ONE TABLET BY MOUTH EVERY MORNING BEFORE BREAKFAST    CHOLECALCIFEROL, VITAMIN D3, (VITAMIN D3 PO) Take 2,000 Units by mouth Three (3) times a week. No current facility-administered medications for this visit. ALLERGIES     Allergies   Allergen Reactions    Pcn [Penicillins] Other (comments)     As child-can't remember          SOCIAL HISTORY     Social History     Tobacco Use    Smoking status: Never    Smokeless tobacco: Never   Substance Use Topics    Alcohol use: Yes     Comment: 1 glass wine 2-3 times/week     Social History     Social History Narrative    , 3 children    2 children in [place], [place], 1 daughter in [place]    Patient and  have an Advance Directive.     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; booster 77 yo        Diet: sensible, well balanced diet    Caffeine: none, except occ chocolate    Exercise: hikes alot w/ , walks regularly ~ 45 minutes to an hour almost every day (3 miles); tennis seasonally ~ 1-2 x a week; occ bikes    Weight: pretty stable, usually runs ~ 120 range         Social History     Substance and Sexual Activity   Sexual Activity Yes    Partners: Male       IMMUNIZATIONS     Immunization History   Administered Date(s) Administered    COVID-19, MODERNA BLUE border, Primary or Immunocompromised, (age 18y+), IM, 100 mcg/0.5mL 2021, 2021, 2022, 2022    COVID-19, PFIZER PURPLE top, DILUTE for use, (age 15 y+), IM, 30mcg/0.3mL 2021    Hep A Vaccine 2013    Hep A Vaccine (Adult) 2014    Hep B Vaccine 2022    Influenza High Dose Vaccine PF 2016, 11/10/2017, 09/15/2018, 10/19/2019, 2020, 10/31/2022    Influenza Vaccine 10/15/2013, 10/14/2014, 10/31/2015, 10/25/2021, 2022    Influenza Vaccine Split 10/10/2012    Influenza Vaccine Whole 10/04/2011    Influenza, FLUAD, (age 72 y+), Adjuvanted 2020    Pneumococcal Conjugate (PCV-13) 2016    Pneumococcal Polysaccharide (PPSV-23) 10/01/2010, 10/19/2010, 2015    TD Vaccine 2008    Td 2019    Typhoid Conjugate Vaccine (Tcv) 2022    Typhoid Vi Capsular Polysaccharide Vaccine 2013    Yellow Fever Vaccine 2013    Zoster Recombinant 2019, 2019    Zoster Vaccine, Live 2003         FAMILY HISTORY     Family History   Problem Relation Age of Onset    Cancer Mother         lung cancer    Hypertension Mother     OSTEOARTHRITIS Mother         obese    Diabetes Father     Cancer Father         bladder cancer and BCC skin cancer    Colon Cancer Sister          age 52    OSTEOARTHRITIS Sister     Breast Cancer Sister         diagnosed age ~ 62, survivor    Depression Son     Anxiety Son     Breast Cancer Other         great aunt         VITALS   Visit Vitals  /68 (BP 1 Location: Left upper arm, BP Patient Position: Sitting, BP Cuff Size: Large adult)   Pulse 72   Temp 98.2 °F (36.8 °C) (Oral)   Resp 16   Ht 5' 3\" (1.6 m)   Wt 120 lb 6.4 oz (54.6 kg)   SpO2 98%   BMI 21.33 kg/m²          PHYSICAL EXAMINATION   Physical Exam  Vitals reviewed. Constitutional:       General: She is not in acute distress. Appearance: Normal appearance. She is not ill-appearing. HENT:      Right Ear: Tympanic membrane normal.      Left Ear: Tympanic membrane normal.   Eyes:      Conjunctiva/sclera: Conjunctivae normal.   Neck:      Thyroid: No thyroid mass, thyromegaly or thyroid tenderness. Vascular: No carotid bruit. Cardiovascular:      Rate and Rhythm: Normal rate and regular rhythm. Heart sounds: Normal heart sounds. Pulmonary:      Effort: Pulmonary effort is normal.      Breath sounds: Normal breath sounds. Abdominal:      General: Abdomen is flat. There is no distension. Palpations: Abdomen is soft. There is no mass. Tenderness: There is no abdominal tenderness. Musculoskeletal:         General: No swelling or tenderness. Normal range of motion. Cervical back: Neck supple. Right lower leg: No edema. Left lower leg: No edema. Lymphadenopathy:      Cervical: No cervical adenopathy. Skin:     General: Skin is warm and dry. Neurological:      General: No focal deficit present. Mental Status: She is alert and oriented to person, place, and time. Mental status is at baseline. Cranial Nerves: No cranial nerve deficit. Motor: No weakness.    Psychiatric:         Mood and Affect: Mood normal.         Cognition and Memory: Cognition and memory normal.              LABORATORY DATA/ANCILLARY/IMAGING     Results for orders placed or performed in visit on 02/22/23   CBC W/O DIFF   Result Value Ref Range    WBC 4.1 3.6 - 11.0 K/uL    RBC 4.33 3.80 - 5.20 M/uL    HGB 13.4 11.5 - 16.0 g/dL    HCT 41.4 35.0 - 47.0 % MCV 95.6 80.0 - 99.0 FL    MCH 30.9 26.0 - 34.0 PG    MCHC 32.4 30.0 - 36.5 g/dL    RDW 12.3 11.5 - 14.5 %    PLATELET 693 681 - 953 K/uL    MPV 10.4 8.9 - 12.9 FL    NRBC 0.0 0  WBC    ABSOLUTE NRBC 0.00 0.00 - 6.22 K/uL   METABOLIC PANEL, COMPREHENSIVE   Result Value Ref Range    Sodium 142 136 - 145 mmol/L    Potassium 4.4 3.5 - 5.1 mmol/L    Chloride 106 97 - 108 mmol/L    CO2 32 21 - 32 mmol/L    Anion gap 4 (L) 5 - 15 mmol/L    Glucose 77 65 - 100 mg/dL    BUN 17 6 - 20 MG/DL    Creatinine 0.91 0.55 - 1.02 MG/DL    BUN/Creatinine ratio 19 12 - 20      eGFR >60 >60 ml/min/1.73m2    Calcium 9.3 8.5 - 10.1 MG/DL    Bilirubin, total 0.7 0.2 - 1.0 MG/DL    ALT (SGPT) 24 12 - 78 U/L    AST (SGOT) 22 15 - 37 U/L    Alk. phosphatase 67 45 - 117 U/L    Protein, total 6.8 6.4 - 8.2 g/dL    Albumin 3.7 3.5 - 5.0 g/dL    Globulin 3.1 2.0 - 4.0 g/dL    A-G Ratio 1.2 1.1 - 2.2     LIPID PANEL   Result Value Ref Range    Cholesterol, total 232 (H) <200 MG/DL    Triglyceride 77 <150 MG/DL    HDL Cholesterol 87 MG/DL    LDL, calculated 129.6 (H) 0 - 100 MG/DL    VLDL, calculated 15.4 MG/DL    CHOL/HDL Ratio 2.7 0.0 - 5.0     TSH 3RD GENERATION   Result Value Ref Range    TSH 3.23 0.36 - 3.74 uIU/mL   T4, FREE   Result Value Ref Range    T4, Free 1.0 0.8 - 1.5 NG/DL             ASSESSMENT & PLAN   Diagnoses and all orders for this visit:    1. Medicare annual wellness visit, subsequent  See separate note under this same encounter visit for Medicare Wellness note. Age/risk based screening recommendations, health maintenance & prevention counseling. Cancer screening USPTFS guidelines reviewed w/ pt today. Discussed benefits/positive/negative outcomes of screening based on age/risk stratification. Informed consent for/against screening based on pt's personal hx/risk factors. Updated in history above and health maintenance. Mammogram up to date  Colonoscopy 5 yr follow up scheduled w/ Dr. Odalis Rowland [phone]    2. Screening for osteoporosis  -     DEXA BONE DENSITY STUDY AXIAL; Future    3. Postmenopausal state  -     DEXA BONE DENSITY STUDY AXIAL; Future    4. Hypercholesterolemia with Excellent HDL  Improved. Reviewed diet, nutrition, exercise, and lipid goals    5. Hypothyroidism, adult  Clinically stable. TSH normal. No change in regimen at this time. Rx renewed today as ordered:  -     levothyroxine (SYNTHROID) 25 mcg tablet; TAKE ONE TABLET BY MOUTH EVERY MORNING BEFORE BREAKFAST    6. Pulsatile tinnitus of left ear  -     REFERRAL TO ENT-OTOLARYNGOLOGY      RTC 1 yr for next annual wellness check and complete fasting labs.  Follow up sooner prn

## 2023-03-08 NOTE — PROGRESS NOTES
This is the Subsequent Medicare Annual Wellness Exam, performed 12 months or more after the Initial AWV or the last Subsequent AWV    I have reviewed the patient's medical history in detail and updated the computerized patient record. Assessment/Plan   Education and counseling provided:  Are appropriate based on today's review and evaluation  Bone mass measurement (DEXA)    1. Medicare annual wellness visit, subsequent  2. Screening for osteoporosis  -     DEXA BONE DENSITY STUDY AXIAL; Future  3. Postmenopausal state  -     DEXA BONE DENSITY STUDY AXIAL; Future       Depression Risk Factor Screening     3 most recent PHQ Screens 3/8/2023   Little interest or pleasure in doing things Not at all   Feeling down, depressed, irritable, or hopeless Not at all   Total Score PHQ 2 0       Alcohol & Drug Abuse Risk Screen   Do you average more than 1 drink per night or more than 7 drinks a week?: (P) No  On any one occasion in the past three months have you had more than 3 drinks containing alcohol?: (P) No            Functional Ability and Level of Safety   Hearing:  Hearing: (P) Patient reports hearing is good      Activities of Daily Living:   The home contains: (P) no safety equipment, rugs  Functional ADLs: (P) Patient does total self care  ADL Assessment 3/8/2023   Feeding yourself No Help Needed   Getting from bed to chair No Help Needed   Getting dressed No Help Needed   Bathing or showering No Help Needed   Walk across the room (includes cane/walker) No Help Needed   Using the telphone No Help Needed   Taking your medications No Help Needed   Preparing meals No Help Needed   Managing money (expenses/bills) No Help Needed   Moderately strenuous housework (laundry) No Help Needed   Shopping for personal items (toiletries/medicines) No Help Needed   Shopping for groceries No Help Needed   Driving No Help Needed   Climbing a flight of stairs No Help Needed   Getting to places beyond walking distances No Help Needed        Ambulation:  Patient ambulates: (P) with no difficulty     Fall Risk:  Fall Risk Assessment, last 12 mths 3/8/2023   Able to walk? Yes   Fall in past 12 months? 0   Do you feel unsteady?  0   Are you worried about falling 0     Abuse Screen:  Do you ever feel afraid of your partner?: (P) No  Are you in a relationship with someone who physically or mentally threatens you?: (P) No  Is it safe for you to go home?: (P) Yes        Cognitive Screening   Has your family/caregiver stated any concerns about your memory?: (P) No         Health Maintenance Due     Health Maintenance Due   Topic Date Due    Colorectal Cancer Screening Combo  02/01/2023       Patient Care Team   Patient Care Team:  Eli Luevano MD as PCP - General  Eli Luevano MD as PCP - [facility] Empaneled Provider    History     Patient Active Problem List   Diagnosis Code    Family history of colon cancer Z80.0    Family history of breast cancer Z80.3    History of mild iron deficiency anemia Z86.2    Hypothyroidism E03.9    Postmenopausal, LMP 2006, No HRT Z78.0    Benign SQ Mass on right back R22.2    Right Inguinal hernia, mild K40.90    S/P colonoscopy Z98.890    Mild Hypercholesterolemia with Excellent HDL E78.00    Screening exams for skin cancer Z12.83    S/P Fever of unknown origin (FUO), Suspected Viral Illness R50.9    Hypothyroidism, adult E03.9    ACP (advance care planning) Z71.89    Primary osteoarthritis of left hip M16.12    Primary osteoarthritis of right knee M17.11     Past Medical History:   Diagnosis Date    ACP (advance care planning) 11/27/2017    Patient has an AMD. ACP note     Benign SQ Mass on right back 4/22/2010    Family history of breast cancer 4/22/2010    Family history of colon cancer 4/22/2010    History of mild iron deficiency anemia 4/22/2010    Hypothyroidism 4/22/2010    Iron deficiency anemia 4/22/2010    Mass on back 4/22/2010    Mild Hypercholesterolemia with Excellent HDL 2013    Postmenopausal 2010    Postmenopausal, LMP 2006, No HRT 2010    Primary osteoarthritis of left hip 2021    Xray by Radha Mirandaar 3-2021, conservative    Primary osteoarthritis of right knee 2021    [facility] [phone]    Right Inguinal hernia, mild 2010    S/P colonoscopy 2010    S/P Fever of unknown origin (FUO), Suspected Viral Illness 2014-2014: ID Dr Nieves Birmingham (spontaneous )     Screening exams for skin cancer 2013    Derm: Previously Dr Kyara Bar, then Dr Kira Garza      Past Surgical History:   Procedure Laterality Date    BIOPSY BREAST      negative, left breast; Dr Khoi Walsh  2002    benign polyp; Dr Abram Howard    COLONOSCOPY  2007    normal; ok x 5yrs; Dr Eden Sher; f/u 5 yrs    DEXA BONE DENSITY STUDY AXIAL  10/2001    normal    DEXA BONE DENSITY STUDY AXIAL  10/2007    normal    EKG ORDERABLE  2008    Sinus bradycardia    EKG ORDERABLE  2009    Sinus bradycardia    HM DEXA SCAN  10/2009, 10/2011    VPI (scanned reports)    HX BREAST BIOPSY Left 1982    years ago; neg    HX COLONOSCOPY  2013    Internal Hemorrhoids; repeat 5 yrs due to fam hx; Dr Nydia Colon COLONOSCOPY  2018    Internal Hemorrhoids, Polyp; repeat 5 yrs due to fam hx; Dr Nydia Colon COLONOSCOPY      Scheduled for  Dr. Anali Hollis    varicose veins, Dr Beatrice Rogel Left 2016    for varicose veins    XR HIP RT W OR WO PELV 2-3 VWS  2008    normal     Current Outpatient Medications   Medication Sig Dispense Refill    levothyroxine (SYNTHROID) 25 mcg tablet TAKE ONE TABLET BY MOUTH EVERY MORNING BEFORE BREAKFAST 90 Tablet 0    CHOLECALCIFEROL, VITAMIN D3, (VITAMIN D3 PO) Take 2,000 Units by mouth Three (3) times a week.        Allergies   Allergen Reactions    Pcn [Penicillins] Other (comments)     As child-can't remember Family History   Problem Relation Age of Onset    Cancer Mother         lung cancer    Hypertension Mother     OSTEOARTHRITIS Mother         obese    Diabetes Father     Cancer Father         bladder cancer and BCC skin cancer    Colon Cancer Sister          age 52    OSTEOARTHRITIS Sister     Breast Cancer Sister         diagnosed age ~ 62, survivor    Depression Son     Anxiety Son     Breast Cancer Other         great aunt     Social History     Tobacco Use    Smoking status: Never    Smokeless tobacco: Never   Substance Use Topics    Alcohol use: Yes     Comment: 1 glass wine 2-3 times/week         Cyndi Cabrera MD

## 2023-03-08 NOTE — PROGRESS NOTES
Chief Complaint   Patient presents with    Annual Wellness Visit    Cholesterol Problem     Fasting labs done 2-22-23    Thyroid Problem     1. \"Have you been to the ER, urgent care clinic since your last visit? Hospitalized since your last visit? \" No    2. \"Have you seen or consulted any other health care providers outside of the [address] [place] [address] since your last visit? \" No     3. For patients aged 39-70: Has the patient had a colonoscopy / FIT/ Cologuard? Yes - no Care Gap present 2/2018 Internal Hemorrhoids, Polyp; repeat 5 yrs due to fam hx; Dr Tiffanie Holder      If the patient is female:    4. For patients aged 41-77: Has the patient had a mammogram within the past 2 years? Yes - no Care Gap present 2/2023      5. For patients aged 21-65: Has the patient had a pap smear?  NA - based on age or sex

## 2023-04-03 ENCOUNTER — HOSPITAL ENCOUNTER (OUTPATIENT)
Dept: BONE DENSITY | Age: 78
End: 2023-04-03
Attending: FAMILY MEDICINE
Payer: MEDICARE

## 2023-04-03 DIAGNOSIS — Z78.0 POSTMENOPAUSAL STATE: ICD-10-CM

## 2023-04-03 DIAGNOSIS — Z13.820 SCREENING FOR OSTEOPOROSIS: ICD-10-CM

## 2023-04-03 PROBLEM — M85.89 OSTEOPENIA OF MULTIPLE SITES: Status: ACTIVE | Noted: 2023-04-03

## 2023-04-03 PROCEDURE — 77080 DXA BONE DENSITY AXIAL: CPT

## 2024-01-11 NOTE — TELEPHONE ENCOUNTER
Last appointment: 3/8/23 MD BOTELLO, labs 2/2023  Next appointment: 3/11/24 MD BOTELLO  Previous refill encounter(s): 3/8/23 90 + 2    Requested Prescriptions     Pending Prescriptions Disp Refills    levothyroxine (SYNTHROID) 25 MCG tablet 90 tablet 0     Sig: Take 1 tablet by mouth every morning (before breakfast)     For Pharmacy Admin Tracking Only    Program: Medication Refill  CPA in place:    Recommendation Provided To:   Intervention Detail: New Rx: 1, reason: Patient Preference  Intervention Accepted By:   Gap Closed?:    Time Spent (min): 5

## 2024-01-12 RX ORDER — LEVOTHYROXINE SODIUM 0.03 MG/1
25 TABLET ORAL
Qty: 90 TABLET | Refills: 0 | Status: SHIPPED | OUTPATIENT
Start: 2024-01-12

## 2024-01-15 PROBLEM — H40.033: Status: ACTIVE | Noted: 2024-01-15

## 2024-02-23 ENCOUNTER — TELEPHONE (OUTPATIENT)
Age: 79
End: 2024-02-23

## 2024-02-23 DIAGNOSIS — Z86.2 HISTORY OF IRON DEFICIENCY ANEMIA: ICD-10-CM

## 2024-02-23 DIAGNOSIS — M85.89 OSTEOPENIA OF MULTIPLE SITES: ICD-10-CM

## 2024-02-23 DIAGNOSIS — E03.9 ACQUIRED HYPOTHYROIDISM: ICD-10-CM

## 2024-02-23 DIAGNOSIS — E78.00 HYPERCHOLESTEROLEMIA: Primary | ICD-10-CM

## 2024-02-23 NOTE — TELEPHONE ENCOUNTER
Pt is scheduled for AWV on 3/11.  She would like to get labs done prior to appt so she can discuss them.    Advised that I will forward request to Dr Balderrama and I will send her a "[employer] message once labs ordered.  I let her know that we ask that she not get labs done any sooner than a week before her appt.  Pt states understanding.    Sent pt a "[employer] message that labs ordered along with letter of instructions and list of the walk in lab clinics.

## 2024-03-04 ENCOUNTER — HOSPITAL ENCOUNTER (OUTPATIENT)
Age: 79
Discharge: HOME | End: 2024-03-07

## 2024-03-04 DIAGNOSIS — Z12.31 VISIT FOR SCREENING MAMMOGRAM: ICD-10-CM

## 2024-03-04 DIAGNOSIS — E03.9 ACQUIRED HYPOTHYROIDISM: ICD-10-CM

## 2024-03-04 DIAGNOSIS — M85.89 OSTEOPENIA OF MULTIPLE SITES: ICD-10-CM

## 2024-03-04 DIAGNOSIS — E78.00 HYPERCHOLESTEROLEMIA: ICD-10-CM

## 2024-03-04 DIAGNOSIS — Z86.2 HISTORY OF IRON DEFICIENCY ANEMIA: ICD-10-CM

## 2024-03-05 LAB
25(OH)D3 SERPL-MCNC: 30.6 NG/ML (ref 30–100)
ALBUMIN SERPL-MCNC: 3.9 G/DL (ref 3.5–5)
ALBUMIN/GLOB SERPL: 1.2 (ref 1.1–2.2)
ALP SERPL-CCNC: 61 U/L (ref 45–117)
ALT SERPL-CCNC: 23 U/L (ref 12–78)
ANION GAP SERPL CALC-SCNC: 3 MMOL/L (ref 5–15)
AST SERPL-CCNC: 25 U/L (ref 15–37)
BILIRUB SERPL-MCNC: 0.8 MG/DL (ref 0.2–1)
BUN SERPL-MCNC: 16 MG/DL (ref 6–20)
BUN/CREAT SERPL: 15 (ref 12–20)
CALCIUM SERPL-MCNC: 10.1 MG/DL (ref 8.5–10.1)
CHLORIDE SERPL-SCNC: 106 MMOL/L (ref 97–108)
CHOLEST SERPL-MCNC: 254 MG/DL
CO2 SERPL-SCNC: 30 MMOL/L (ref 21–32)
CREAT SERPL-MCNC: 1.07 MG/DL (ref 0.55–1.02)
ERYTHROCYTE [DISTWIDTH] IN BLOOD BY AUTOMATED COUNT: 11.9 % (ref 11.5–14.5)
GLOBULIN SER CALC-MCNC: 3.2 G/DL (ref 2–4)
GLUCOSE SERPL-MCNC: 88 MG/DL (ref 65–100)
HCT VFR BLD AUTO: 40.7 % (ref 35–47)
HDLC SERPL-MCNC: 101 MG/DL
HDLC SERPL: 2.5 (ref 0–5)
HGB BLD-MCNC: 13.7 G/DL (ref 11.5–16)
LDLC SERPL CALC-MCNC: 140 MG/DL (ref 0–100)
MCH RBC QN AUTO: 31.2 PG (ref 26–34)
MCHC RBC AUTO-ENTMCNC: 33.7 G/DL (ref 30–36.5)
MCV RBC AUTO: 92.7 FL (ref 80–99)
NRBC # BLD: 0 K/UL (ref 0–0.01)
NRBC BLD-RTO: 0 PER 100 WBC
PLATELET # BLD AUTO: 210 K/UL (ref 150–400)
PMV BLD AUTO: 10.4 FL (ref 8.9–12.9)
POTASSIUM SERPL-SCNC: 4.4 MMOL/L (ref 3.5–5.1)
PROT SERPL-MCNC: 7.1 G/DL (ref 6.4–8.2)
RBC # BLD AUTO: 4.39 M/UL (ref 3.8–5.2)
SODIUM SERPL-SCNC: 139 MMOL/L (ref 136–145)
TRIGL SERPL-MCNC: 65 MG/DL
TSH SERPL DL<=0.05 MIU/L-ACNC: 2.77 UIU/ML (ref 0.36–3.74)
VLDLC SERPL CALC-MCNC: 13 MG/DL
WBC # BLD AUTO: 5.1 K/UL (ref 3.6–11)

## 2024-03-08 SDOH — HEALTH STABILITY: PHYSICAL HEALTH: ON AVERAGE, HOW MANY MINUTES DO YOU ENGAGE IN EXERCISE AT THIS LEVEL?: 60 MIN

## 2024-03-08 SDOH — ECONOMIC STABILITY: FOOD INSECURITY: WITHIN THE PAST 12 MONTHS, YOU WORRIED THAT YOUR FOOD WOULD RUN OUT BEFORE YOU GOT MONEY TO BUY MORE.: NEVER TRUE

## 2024-03-08 SDOH — HEALTH STABILITY: PHYSICAL HEALTH: ON AVERAGE, HOW MANY DAYS PER WEEK DO YOU ENGAGE IN MODERATE TO STRENUOUS EXERCISE (LIKE A BRISK WALK)?: 6 DAYS

## 2024-03-08 SDOH — ECONOMIC STABILITY: TRANSPORTATION INSECURITY
IN THE PAST 12 MONTHS, HAS LACK OF TRANSPORTATION KEPT YOU FROM MEETINGS, WORK, OR FROM GETTING THINGS NEEDED FOR DAILY LIVING?: NO

## 2024-03-08 SDOH — ECONOMIC STABILITY: FOOD INSECURITY: WITHIN THE PAST 12 MONTHS, THE FOOD YOU BOUGHT JUST DIDN'T LAST AND YOU DIDN'T HAVE MONEY TO GET MORE.: NEVER TRUE

## 2024-03-08 SDOH — ECONOMIC STABILITY: INCOME INSECURITY: HOW HARD IS IT FOR YOU TO PAY FOR THE VERY BASICS LIKE FOOD, HOUSING, MEDICAL CARE, AND HEATING?: NOT HARD AT ALL

## 2024-03-08 SDOH — ECONOMIC STABILITY: HOUSING INSECURITY
IN THE LAST 12 MONTHS, WAS THERE A TIME WHEN YOU DID NOT HAVE A STEADY PLACE TO SLEEP OR SLEPT IN A SHELTER (INCLUDING NOW)?: NO

## 2024-03-08 ASSESSMENT — PATIENT HEALTH QUESTIONNAIRE - PHQ9
SUM OF ALL RESPONSES TO PHQ QUESTIONS 1-9: 0
2. FEELING DOWN, DEPRESSED OR HOPELESS: 0
SUM OF ALL RESPONSES TO PHQ9 QUESTIONS 1 & 2: 0
SUM OF ALL RESPONSES TO PHQ QUESTIONS 1-9: 0
1. LITTLE INTEREST OR PLEASURE IN DOING THINGS: 0

## 2024-03-08 ASSESSMENT — LIFESTYLE VARIABLES
HOW MANY STANDARD DRINKS CONTAINING ALCOHOL DO YOU HAVE ON A TYPICAL DAY: 1
HOW OFTEN DO YOU HAVE A DRINK CONTAINING ALCOHOL: 4
HOW MANY STANDARD DRINKS CONTAINING ALCOHOL DO YOU HAVE ON A TYPICAL DAY: 1 OR 2
HOW OFTEN DO YOU HAVE A DRINK CONTAINING ALCOHOL: 2-3 TIMES A WEEK
HOW OFTEN DO YOU HAVE SIX OR MORE DRINKS ON ONE OCCASION: 1

## 2024-03-11 ENCOUNTER — TELEPHONE (OUTPATIENT)
Age: 79
End: 2024-03-11

## 2024-03-11 ENCOUNTER — OFFICE VISIT (OUTPATIENT)
Age: 79
End: 2024-03-11
Payer: MEDICARE

## 2024-03-11 VITALS
DIASTOLIC BLOOD PRESSURE: 68 MMHG | RESPIRATION RATE: 16 BRPM | HEIGHT: 64 IN | TEMPERATURE: 97.7 F | BODY MASS INDEX: 20.38 KG/M2 | WEIGHT: 119.4 LBS | SYSTOLIC BLOOD PRESSURE: 108 MMHG

## 2024-03-11 DIAGNOSIS — R92.30 DENSE BREAST TISSUE: ICD-10-CM

## 2024-03-11 DIAGNOSIS — R92.30 DENSE BREAST TISSUE: Primary | ICD-10-CM

## 2024-03-11 DIAGNOSIS — Z00.00 MEDICARE ANNUAL WELLNESS VISIT, SUBSEQUENT: Primary | ICD-10-CM

## 2024-03-11 DIAGNOSIS — E78.00 HYPERCHOLESTEROLEMIA: ICD-10-CM

## 2024-03-11 DIAGNOSIS — E03.9 HYPOTHYROIDISM, ADULT: ICD-10-CM

## 2024-03-11 DIAGNOSIS — N28.9 MILD RENAL INSUFFICIENCY: ICD-10-CM

## 2024-03-11 PROCEDURE — 1036F TOBACCO NON-USER: CPT | Performed by: FAMILY MEDICINE

## 2024-03-11 PROCEDURE — 1090F PRES/ABSN URINE INCON ASSESS: CPT | Performed by: FAMILY MEDICINE

## 2024-03-11 PROCEDURE — 99214 OFFICE O/P EST MOD 30 MIN: CPT | Performed by: FAMILY MEDICINE

## 2024-03-11 PROCEDURE — G8399 PT W/DXA RESULTS DOCUMENT: HCPCS | Performed by: FAMILY MEDICINE

## 2024-03-11 PROCEDURE — G8484 FLU IMMUNIZE NO ADMIN: HCPCS | Performed by: FAMILY MEDICINE

## 2024-03-11 PROCEDURE — G8427 DOCREV CUR MEDS BY ELIG CLIN: HCPCS | Performed by: FAMILY MEDICINE

## 2024-03-11 PROCEDURE — 1123F ACP DISCUSS/DSCN MKR DOCD: CPT | Performed by: FAMILY MEDICINE

## 2024-03-11 PROCEDURE — G8420 CALC BMI NORM PARAMETERS: HCPCS | Performed by: FAMILY MEDICINE

## 2024-03-11 PROCEDURE — G0439 PPPS, SUBSEQ VISIT: HCPCS | Performed by: FAMILY MEDICINE

## 2024-03-11 RX ORDER — LATANOPROST 50 UG/ML
1 SOLUTION/ DROPS OPHTHALMIC NIGHTLY
COMMUNITY
Start: 2024-03-03

## 2024-03-11 RX ORDER — MULTIVIT-MIN/IRON/FOLIC ACID/K 18-600-40
1 CAPSULE ORAL
COMMUNITY

## 2024-03-11 ASSESSMENT — ENCOUNTER SYMPTOMS
GASTROINTESTINAL NEGATIVE: 1
EYES NEGATIVE: 1
RESPIRATORY NEGATIVE: 1

## 2024-03-11 NOTE — TELEPHONE ENCOUNTER
Josefina from [facility] called stating that they are also needing a bilateral breast ultrasound to go with the Mammogram order we made. She is just hoping to have this put in as well.    Best call back number  [phone]

## 2024-03-11 NOTE — PROGRESS NOTES
Chief Complaint   Patient presents with    Medicare AWV         Cholesterol Problem     Fasting labs done 3-4-24    Order for Diagnostic Mammogram     HISTORY OF PRESENT ILLNESS   HPI  Patient presents for follow up hypercholesterolemia, hypothyroidism, medication check and to discuss recent complete fasting lab results. Complying routinely w/ medication regimen updated below. Tolerating w/o reaction or side effects.  She has specific questions about her lab results which we discussed in detail today.   She is generally feeling well.  She did for for screening mammogram recently but mentioned feeling some \"fullness\" and \"thickness\" along the lower part of her right breast. They therefore cancelled the screening mammogram and advised her she needed to check w/ me to change to a diagnostic mammo instead. There is no breast pain. No skin changes. No nipple dc. No constitutional symptoms.   She mentions a slight hand tremor that is mostly only noticeable when eating bringing the utensil to her mouth. No weakness. Not dropping things. Not problematic right now but just wants to notate it. No leg weakness or paresthesias. No memory loss or cognitive changes/concerns. No imbalance, poor coordination or falling. She is unaware of family hx of tremors. She doesn't want medication right now bc not bothersome/or problematic enough and will follow up if anything worsens.   Diet: sensible, well balanced diet    Caffeine: none, except occ chocolate    Etoh: 3 oz glass of wine 2-3 x a week  Exercise: hikes alot w/ , walks regularly ~ 45 minutes to an hour ~ 6 days a week (3 miles); tennis seasonally ~ 1-2 x a week; pickle ball once a week if not tennis; occ bikes    Weight: pretty stable, usually runs ~ 120 range       REVIEW OF SYMPTOMS   Review of Systems   Constitutional: Negative.    HENT: Negative.     Eyes: Negative.    Respiratory: Negative.     Cardiovascular: Negative.    Gastrointestinal: Negative.

## 2024-03-11 NOTE — PROGRESS NOTES
Chief Complaint   Patient presents with    Medicare AWV          Nurse Note:  1. \"Have you been to the ER, urgent care clinic since your last visit?  Hospitalized since your last visit?\" No    2. \"Have you seen or consulted any other health care providers outside of the [facility] since your last visit?\" Yes opthalm Dr Dr Radhames Nelson    3. For patients aged 45-75: Has the patient had a colonoscopy / FIT/ Cologuard? Yes - no Care Gap present 4/2023 Dr Pineda      If the patient is female:    4. For patients aged 40-74: Has the patient had a mammogram within the past 2 years? Yes - no Care Gap present 2/2023      5. For patients aged 21-65: Has the patient had a pap smear? NA - based on age or sex  ------------------------------------------------------------------------------------------------------------------------------------------------------    Medicare Annual Wellness Visit    Elizabeth Coffman is here for Medicare AWV ()    Assessment & Plan   Medicare annual wellness visit, subsequent  Recommendations for Preventive Services Due: see orders and patient instructions/AVS.  Recommended screening schedule for the next 5-10 years is provided to the patient in written form: see Patient Instructions/AVS.    Refer to separate note in this encounter for follow up of patient's chronic conditions, disease management, problems addressed today, and/or other health concerns as noted.              Positive Risk Factor Screenings with Interventions:                    Safety:  Do you have non-slip mats or non-slip surfaces or shower bars or grab bars in your shower or bathtub?: (!) No  Interventions:  Fall prevention, safety, and recommendations to implement the above discussed.  See AVS for additional education material                     CareTeam (Including outside providers/suppliers regularly involved in providing care):   Patient Care Team:  Qiana Luo MD as PCP -

## 2024-03-21 DIAGNOSIS — N28.9 MILD RENAL INSUFFICIENCY: ICD-10-CM

## 2024-03-21 LAB
ANION GAP SERPL CALC-SCNC: 2 MMOL/L (ref 5–15)
BUN SERPL-MCNC: 18 MG/DL (ref 6–20)
BUN/CREAT SERPL: 18 (ref 12–20)
CALCIUM SERPL-MCNC: 9.4 MG/DL (ref 8.5–10.1)
CHLORIDE SERPL-SCNC: 108 MMOL/L (ref 97–108)
CO2 SERPL-SCNC: 31 MMOL/L (ref 21–32)
CREAT SERPL-MCNC: 0.99 MG/DL (ref 0.55–1.02)
GLUCOSE SERPL-MCNC: 77 MG/DL (ref 65–100)
POTASSIUM SERPL-SCNC: 4.1 MMOL/L (ref 3.5–5.1)
SODIUM SERPL-SCNC: 141 MMOL/L (ref 136–145)

## 2024-04-04 NOTE — TELEPHONE ENCOUNTER
Last appointment: 3/11/24 MD BOTELLO, labs completed  Next appointment: None  Previous refill encounter(s): 1/12/24 90    Requested Prescriptions     Pending Prescriptions Disp Refills    levothyroxine (SYNTHROID) 25 MCG tablet 90 tablet 3     Sig: Take 1 tablet by mouth every morning (before breakfast)     For Pharmacy Admin Tracking Only    Program: Medication Refill  CPA in place:    Recommendation Provided To:   Intervention Detail: New Rx: 1, reason: Patient Preference  Intervention Accepted By:   Gap Closed?:    Time Spent (min): 5

## 2024-04-06 RX ORDER — LEVOTHYROXINE SODIUM 0.03 MG/1
25 TABLET ORAL
Qty: 90 TABLET | Refills: 2 | Status: SHIPPED | OUTPATIENT
Start: 2024-04-06

## 2024-04-10 ENCOUNTER — HOSPITAL ENCOUNTER (OUTPATIENT)
Facility: HOSPITAL | Age: 79
Discharge: HOME | End: 2024-04-13
Attending: FAMILY MEDICINE
Payer: MEDICARE

## 2024-04-10 VITALS — BODY MASS INDEX: 20.32 KG/M2 | HEIGHT: 64 IN | WEIGHT: 119 LBS

## 2024-04-10 DIAGNOSIS — N63.14 MASS OF LOWER INNER QUADRANT OF RIGHT BREAST: ICD-10-CM

## 2024-04-10 DIAGNOSIS — R92.30 DENSE BREAST TISSUE: ICD-10-CM

## 2024-04-10 PROCEDURE — G0279 TOMOSYNTHESIS, MAMMO: HCPCS

## 2024-04-10 PROCEDURE — 76642 ULTRASOUND BREAST LIMITED: CPT

## 2024-12-16 RX ORDER — LEVOTHYROXINE SODIUM 25 UG/1
25 TABLET ORAL
Qty: 90 TABLET | Refills: 0 | Status: SHIPPED | OUTPATIENT
Start: 2024-12-16

## 2025-02-28 LAB
Lab: NORMAL
Lab: NORMAL
REFERENCE LAB: NORMAL

## 2025-03-17 RX ORDER — LEVOTHYROXINE SODIUM 25 UG/1
25 TABLET ORAL
Qty: 30 TABLET | Refills: 0 | Status: SHIPPED | OUTPATIENT
Start: 2025-03-17 | End: 2025-03-31 | Stop reason: SDUPTHER

## 2025-03-17 NOTE — TELEPHONE ENCOUNTER
Last appointment: 3/11/24 MD BOTELLO  Next appointment: 3/31/25 MD BOTELLO  Previous refill encounter(s): 12/16/24 90    Requested Prescriptions     Pending Prescriptions Disp Refills    levothyroxine (SYNTHROID) 25 MCG tablet 30 tablet 0     Sig: Take 1 tablet by mouth every morning (before breakfast)     For Pharmacy Admin Tracking Only    Program: Medication Refill  CPA in place:    Recommendation Provided To:   Intervention Detail: New Rx: 1, reason: Patient Preference  Intervention Accepted By:   Gap Closed?:    Time Spent (min): 5

## 2025-03-24 DIAGNOSIS — Z86.2 HISTORY OF IRON DEFICIENCY ANEMIA: ICD-10-CM

## 2025-03-24 DIAGNOSIS — N28.9 MILD RENAL INSUFFICIENCY: ICD-10-CM

## 2025-03-24 DIAGNOSIS — E03.9 ACQUIRED HYPOTHYROIDISM: ICD-10-CM

## 2025-03-24 DIAGNOSIS — E78.00 HYPERCHOLESTEROLEMIA: Primary | ICD-10-CM

## 2025-03-26 ENCOUNTER — RESULTS FOLLOW-UP (OUTPATIENT)
Age: 80
End: 2025-03-26

## 2025-03-26 DIAGNOSIS — N28.9 MILD RENAL INSUFFICIENCY: ICD-10-CM

## 2025-03-26 DIAGNOSIS — E03.9 ACQUIRED HYPOTHYROIDISM: ICD-10-CM

## 2025-03-26 DIAGNOSIS — E78.00 HYPERCHOLESTEROLEMIA: ICD-10-CM

## 2025-03-26 DIAGNOSIS — Z86.2 HISTORY OF IRON DEFICIENCY ANEMIA: ICD-10-CM

## 2025-03-26 LAB
ALBUMIN SERPL-MCNC: 3.6 G/DL (ref 3.5–5)
ALBUMIN/GLOB SERPL: 1.1 (ref 1.1–2.2)
ALP SERPL-CCNC: 70 U/L (ref 45–117)
ALT SERPL-CCNC: 26 U/L (ref 12–78)
ANION GAP SERPL CALC-SCNC: 6 MMOL/L (ref 2–12)
AST SERPL-CCNC: 26 U/L (ref 15–37)
BILIRUB SERPL-MCNC: 0.6 MG/DL (ref 0.2–1)
BUN SERPL-MCNC: 17 MG/DL (ref 6–20)
BUN/CREAT SERPL: 18 (ref 12–20)
CALCIUM SERPL-MCNC: 9.3 MG/DL (ref 8.5–10.1)
CHLORIDE SERPL-SCNC: 103 MMOL/L (ref 97–108)
CHOLEST SERPL-MCNC: 251 MG/DL
CO2 SERPL-SCNC: 29 MMOL/L (ref 21–32)
CREAT SERPL-MCNC: 0.96 MG/DL (ref 0.55–1.02)
ERYTHROCYTE [DISTWIDTH] IN BLOOD BY AUTOMATED COUNT: 12.2 % (ref 11.5–14.5)
GLOBULIN SER CALC-MCNC: 3.3 G/DL (ref 2–4)
GLUCOSE SERPL-MCNC: 92 MG/DL (ref 65–100)
HCT VFR BLD AUTO: 39 % (ref 35–47)
HDLC SERPL-MCNC: 98 MG/DL
HDLC SERPL: 2.6 (ref 0–5)
HGB BLD-MCNC: 12.7 G/DL (ref 11.5–16)
LDLC SERPL CALC-MCNC: 140.4 MG/DL (ref 0–100)
MCH RBC QN AUTO: 30.5 PG (ref 26–34)
MCHC RBC AUTO-ENTMCNC: 32.6 G/DL (ref 30–36.5)
MCV RBC AUTO: 93.8 FL (ref 80–99)
NRBC # BLD: 0 K/UL (ref 0–0.01)
NRBC BLD-RTO: 0 PER 100 WBC
PLATELET # BLD AUTO: 244 K/UL (ref 150–400)
PMV BLD AUTO: 10.5 FL (ref 8.9–12.9)
POTASSIUM SERPL-SCNC: 4.3 MMOL/L (ref 3.5–5.1)
PROT SERPL-MCNC: 6.9 G/DL (ref 6.4–8.2)
RBC # BLD AUTO: 4.16 M/UL (ref 3.8–5.2)
SODIUM SERPL-SCNC: 138 MMOL/L (ref 136–145)
TRIGL SERPL-MCNC: 63 MG/DL
TSH SERPL DL<=0.05 MIU/L-ACNC: 3.1 UIU/ML (ref 0.36–3.74)
VLDLC SERPL CALC-MCNC: 12.6 MG/DL
WBC # BLD AUTO: 4.9 K/UL (ref 3.6–11)

## 2025-03-28 SDOH — HEALTH STABILITY: PHYSICAL HEALTH: ON AVERAGE, HOW MANY MINUTES DO YOU ENGAGE IN EXERCISE AT THIS LEVEL?: 60 MIN

## 2025-03-28 SDOH — ECONOMIC STABILITY: FOOD INSECURITY: WITHIN THE PAST 12 MONTHS, YOU WORRIED THAT YOUR FOOD WOULD RUN OUT BEFORE YOU GOT MONEY TO BUY MORE.: NEVER TRUE

## 2025-03-28 SDOH — ECONOMIC STABILITY: FOOD INSECURITY: WITHIN THE PAST 12 MONTHS, THE FOOD YOU BOUGHT JUST DIDN'T LAST AND YOU DIDN'T HAVE MONEY TO GET MORE.: NEVER TRUE

## 2025-03-28 SDOH — ECONOMIC STABILITY: INCOME INSECURITY: IN THE LAST 12 MONTHS, WAS THERE A TIME WHEN YOU WERE NOT ABLE TO PAY THE MORTGAGE OR RENT ON TIME?: NO

## 2025-03-28 SDOH — HEALTH STABILITY: PHYSICAL HEALTH: ON AVERAGE, HOW MANY DAYS PER WEEK DO YOU ENGAGE IN MODERATE TO STRENUOUS EXERCISE (LIKE A BRISK WALK)?: 6 DAYS

## 2025-03-28 SDOH — ECONOMIC STABILITY: TRANSPORTATION INSECURITY
IN THE PAST 12 MONTHS, HAS THE LACK OF TRANSPORTATION KEPT YOU FROM MEDICAL APPOINTMENTS OR FROM GETTING MEDICATIONS?: NO

## 2025-03-28 ASSESSMENT — LIFESTYLE VARIABLES
HOW OFTEN DO YOU HAVE A DRINK CONTAINING ALCOHOL: 2-3 TIMES A WEEK
HOW OFTEN DO YOU HAVE SIX OR MORE DRINKS ON ONE OCCASION: 1
HOW MANY STANDARD DRINKS CONTAINING ALCOHOL DO YOU HAVE ON A TYPICAL DAY: 1
HOW OFTEN DO YOU HAVE A DRINK CONTAINING ALCOHOL: 4
HOW MANY STANDARD DRINKS CONTAINING ALCOHOL DO YOU HAVE ON A TYPICAL DAY: 1 OR 2

## 2025-03-28 ASSESSMENT — PATIENT HEALTH QUESTIONNAIRE - PHQ9
SUM OF ALL RESPONSES TO PHQ QUESTIONS 1-9: 0
SUM OF ALL RESPONSES TO PHQ QUESTIONS 1-9: 0
1. LITTLE INTEREST OR PLEASURE IN DOING THINGS: NOT AT ALL
SUM OF ALL RESPONSES TO PHQ QUESTIONS 1-9: 0
2. FEELING DOWN, DEPRESSED OR HOPELESS: NOT AT ALL
SUM OF ALL RESPONSES TO PHQ QUESTIONS 1-9: 0

## 2025-03-31 ENCOUNTER — OFFICE VISIT (OUTPATIENT)
Age: 80
End: 2025-03-31
Payer: MEDICARE

## 2025-03-31 VITALS
WEIGHT: 119.8 LBS | DIASTOLIC BLOOD PRESSURE: 72 MMHG | HEIGHT: 64 IN | RESPIRATION RATE: 16 BRPM | OXYGEN SATURATION: 97 % | BODY MASS INDEX: 20.45 KG/M2 | HEART RATE: 69 BPM | TEMPERATURE: 97.8 F | SYSTOLIC BLOOD PRESSURE: 118 MMHG

## 2025-03-31 DIAGNOSIS — E78.00 HYPERCHOLESTEROLEMIA: ICD-10-CM

## 2025-03-31 DIAGNOSIS — Z13.820 SCREENING FOR OSTEOPOROSIS: ICD-10-CM

## 2025-03-31 DIAGNOSIS — Z78.0 POSTMENOPAUSAL STATE: ICD-10-CM

## 2025-03-31 DIAGNOSIS — Z12.31 SCREENING MAMMOGRAM FOR BREAST CANCER: ICD-10-CM

## 2025-03-31 DIAGNOSIS — Z00.00 MEDICARE ANNUAL WELLNESS VISIT, SUBSEQUENT: Primary | ICD-10-CM

## 2025-03-31 DIAGNOSIS — E03.9 ACQUIRED HYPOTHYROIDISM: ICD-10-CM

## 2025-03-31 DIAGNOSIS — M85.89 OSTEOPENIA OF MULTIPLE SITES: ICD-10-CM

## 2025-03-31 PROBLEM — I73.00 RAYNAUD'S PHENOMENON WITHOUT GANGRENE: Status: ACTIVE | Noted: 2025-03-31

## 2025-03-31 PROCEDURE — 1159F MED LIST DOCD IN RCRD: CPT | Performed by: FAMILY MEDICINE

## 2025-03-31 PROCEDURE — G8427 DOCREV CUR MEDS BY ELIG CLIN: HCPCS | Performed by: FAMILY MEDICINE

## 2025-03-31 PROCEDURE — 1126F AMNT PAIN NOTED NONE PRSNT: CPT | Performed by: FAMILY MEDICINE

## 2025-03-31 PROCEDURE — 1090F PRES/ABSN URINE INCON ASSESS: CPT | Performed by: FAMILY MEDICINE

## 2025-03-31 PROCEDURE — 99214 OFFICE O/P EST MOD 30 MIN: CPT | Performed by: FAMILY MEDICINE

## 2025-03-31 PROCEDURE — G0439 PPPS, SUBSEQ VISIT: HCPCS | Performed by: FAMILY MEDICINE

## 2025-03-31 PROCEDURE — G8399 PT W/DXA RESULTS DOCUMENT: HCPCS | Performed by: FAMILY MEDICINE

## 2025-03-31 PROCEDURE — G8420 CALC BMI NORM PARAMETERS: HCPCS | Performed by: FAMILY MEDICINE

## 2025-03-31 PROCEDURE — 1160F RVW MEDS BY RX/DR IN RCRD: CPT | Performed by: FAMILY MEDICINE

## 2025-03-31 PROCEDURE — 1036F TOBACCO NON-USER: CPT | Performed by: FAMILY MEDICINE

## 2025-03-31 PROCEDURE — 1123F ACP DISCUSS/DSCN MKR DOCD: CPT | Performed by: FAMILY MEDICINE

## 2025-03-31 RX ORDER — LEVOTHYROXINE SODIUM 25 UG/1
25 TABLET ORAL
Qty: 90 TABLET | Refills: 3 | Status: SHIPPED | OUTPATIENT
Start: 2025-03-31

## 2025-03-31 ASSESSMENT — ENCOUNTER SYMPTOMS
RESPIRATORY NEGATIVE: 1
GASTROINTESTINAL NEGATIVE: 1
ALLERGIC/IMMUNOLOGIC NEGATIVE: 1

## 2025-03-31 NOTE — PROGRESS NOTES
Chief Complaint   Patient presents with    Medicare AWV         Cholesterol Problem     Fasting labs done 3/26/25    Hypothyroidism     1. \"Have you been to the ER, urgent care clinic since your last visit?  Hospitalized since your last visit?\" No    2. \"Have you seen or consulted any other health care providers outside of the [facility] since your last visit?\" Eye exam Dr BRADLEY Nelson, derm Dr Recinos    3. For patients aged 45-75: Has the patient had a colonoscopy / FIT/ Cologuard? Yes - no Care Gap present 4/2023 Internal Hemorrhoids; No follow up needed per Dr. Pineda; patient choosed to repeat 5 yrs due to familly hx in sister       If the patient is female:    4. For patients aged 40-74: Has the patient had a mammogram within the past 2 years? Yes - no Care Gap present 4/2024 scheduled in April 5. For patients aged 21-65: Has the patient had a pap smear? NA - based on age or sex      
bradycardia    [facility] DEXA SCAN  10/2009, 10/2011    VPI (scanned reports)    VEIN SURGERY  1982    varicose veins, Dr Kaur    VEIN SURGERY Left 06/2016    for varicose veins    XR HIP 2-3 VW W PELVIS RIGHT  09/2008    normal     Current Outpatient Medications   Medication Sig    levothyroxine (SYNTHROID) 25 MCG tablet Take 1 tablet by mouth every morning (before breakfast)    Cholecalciferol (VITAMIN D) 50 MCG (2000 UT) CAPS capsule Take 1 capsule by mouth daily    Calcium Carb-Cholecalciferol (CALCIUM 600+D3 PO) Take 1 tablet by mouth in the morning and at bedtime Calcium 600 mg, Vitamin D3 400 IU per tablet     No current facility-administered medications for this visit.     Allergies   Allergen Reactions    Penicillins Other (See Comments)     As child-can't remember     Social History     Tobacco Use    Smoking status: Never    Smokeless tobacco: Never   Substance Use Topics    Alcohol use: Yes     Comment: 3 oz glass of wine 2-3 x a week    Drug use: No     Immunization History   Administered Date(s) Administered    COVID-19, MODERNA BLUE border, Primary or Immunocompromised, (age 12y+), IM, 100 mcg/0.5mL 01/01/2021, 01/29/2021, 04/23/2022    COVID-19, MODERNA Bivalent, (age 12y+), IM, 50 mcg/0.5 mL 09/21/2022    COVID-19, PFIZER GRAY top, DO NOT Dilute, (age 12 y+), IM, 30 mcg/0.3 mL 10/01/2024    COVID-19, PFIZER PURPLE top, DILUTE for use, (age 12 y+), 30mcg/0.3mL 09/23/2021    COVID-19, PFIZER, 2024/25, (age 12y+), IM, 30mcg/0.3mL 10/13/2023    Hep A, HAVRIX, VAQTA, (age 19y+), IM, 1mL 02/12/2014    Hepatitis A Vaccine 05/20/2013    Influenza Virus Vaccine 10/04/2011, 10/10/2012, 10/15/2013, 10/14/2014, 10/31/2015, 10/25/2021    Influenza, FLUAD, (age 65 y+), IM, Quadv, 0.5mL 09/25/2020, 10/07/2023    Influenza, FLUZONE High Dose, (age 65 y+), IM, Trivalent PF, 0.5mL 11/07/2016, 11/10/2017, 09/15/2018, 10/19/2019, 10/01/2024    Pneumococcal, PCV-13, PREVNAR 13, (age 6w+), IM, 0.5mL 12/01/2016

## 2025-04-11 ENCOUNTER — HOSPITAL ENCOUNTER (OUTPATIENT)
Age: 80
Discharge: HOME | End: 2025-04-14
Payer: MEDICARE

## 2025-04-11 VITALS — HEIGHT: 64 IN | WEIGHT: 120 LBS | BODY MASS INDEX: 20.49 KG/M2

## 2025-04-11 DIAGNOSIS — M85.89 OSTEOPENIA OF MULTIPLE SITES: ICD-10-CM

## 2025-04-11 DIAGNOSIS — Z13.820 SCREENING FOR OSTEOPOROSIS: ICD-10-CM

## 2025-04-11 DIAGNOSIS — Z12.31 SCREENING MAMMOGRAM, ENCOUNTER FOR: ICD-10-CM

## 2025-04-11 DIAGNOSIS — Z78.0 POSTMENOPAUSAL STATE: ICD-10-CM

## 2025-04-11 PROCEDURE — 77067 SCR MAMMO BI INCL CAD: CPT

## 2025-04-11 PROCEDURE — 77080 DXA BONE DENSITY AXIAL: CPT

## 2025-04-19 ENCOUNTER — RESULTS FOLLOW-UP (OUTPATIENT)
Age: 80
End: 2025-04-19